# Patient Record
Sex: FEMALE | Race: WHITE | NOT HISPANIC OR LATINO | Employment: FULL TIME | URBAN - METROPOLITAN AREA
[De-identification: names, ages, dates, MRNs, and addresses within clinical notes are randomized per-mention and may not be internally consistent; named-entity substitution may affect disease eponyms.]

---

## 2017-01-12 ENCOUNTER — ROUTINE PRENATAL (OUTPATIENT)
Dept: OBSTETRICS AND GYNECOLOGY | Facility: CLINIC | Age: 32
End: 2017-01-12
Attending: OBSTETRICS & GYNECOLOGY
Payer: COMMERCIAL

## 2017-01-12 VITALS
SYSTOLIC BLOOD PRESSURE: 112 MMHG | DIASTOLIC BLOOD PRESSURE: 64 MMHG | WEIGHT: 174.19 LBS | BODY MASS INDEX: 28.54 KG/M2

## 2017-01-12 DIAGNOSIS — Z34.83 PRENATAL CARE, SUBSEQUENT PREGNANCY, THIRD TRIMESTER: Primary | ICD-10-CM

## 2017-01-12 PROCEDURE — 0502F SUBSEQUENT PRENATAL CARE: CPT | Mod: S$GLB,,, | Performed by: OBSTETRICS & GYNECOLOGY

## 2017-01-12 PROCEDURE — 99999 PR PBB SHADOW E&M-EST. PATIENT-LVL I: CPT | Mod: PBBFAC,,, | Performed by: OBSTETRICS & GYNECOLOGY

## 2017-01-20 ENCOUNTER — OFFICE VISIT (OUTPATIENT)
Dept: MATERNAL FETAL MEDICINE | Facility: CLINIC | Age: 32
End: 2017-01-20
Payer: COMMERCIAL

## 2017-01-20 ENCOUNTER — ROUTINE PRENATAL (OUTPATIENT)
Dept: OBSTETRICS AND GYNECOLOGY | Facility: CLINIC | Age: 32
End: 2017-01-20
Payer: COMMERCIAL

## 2017-01-20 ENCOUNTER — LAB VISIT (OUTPATIENT)
Dept: LAB | Facility: OTHER | Age: 32
End: 2017-01-20
Attending: NURSE PRACTITIONER
Payer: COMMERCIAL

## 2017-01-20 VITALS
DIASTOLIC BLOOD PRESSURE: 82 MMHG | SYSTOLIC BLOOD PRESSURE: 114 MMHG | BODY MASS INDEX: 29.37 KG/M2 | WEIGHT: 179.25 LBS

## 2017-01-20 DIAGNOSIS — O30.043 DICHORIONIC DIAMNIOTIC TWIN PREGNANCY IN THIRD TRIMESTER: ICD-10-CM

## 2017-01-20 DIAGNOSIS — Z34.80 SUPERVISION OF OTHER NORMAL PREGNANCY: ICD-10-CM

## 2017-01-20 DIAGNOSIS — Z34.80 SUPERVISION OF OTHER NORMAL PREGNANCY: Primary | ICD-10-CM

## 2017-01-20 DIAGNOSIS — O30.042 DICHORIONIC DIAMNIOTIC TWIN PREGNANCY IN SECOND TRIMESTER: ICD-10-CM

## 2017-01-20 LAB
ALBUMIN SERPL BCP-MCNC: 2.6 G/DL
ALP SERPL-CCNC: 161 U/L
ALT SERPL W/O P-5'-P-CCNC: 20 U/L
ANION GAP SERPL CALC-SCNC: 8 MMOL/L
AST SERPL-CCNC: 25 U/L
BASOPHILS # BLD AUTO: 0.02 K/UL
BASOPHILS NFR BLD: 0.2 %
BILIRUB SERPL-MCNC: 0.5 MG/DL
BUN SERPL-MCNC: 8 MG/DL
CALCIUM SERPL-MCNC: 8.7 MG/DL
CHLORIDE SERPL-SCNC: 106 MMOL/L
CO2 SERPL-SCNC: 22 MMOL/L
CREAT SERPL-MCNC: 0.7 MG/DL
DIFFERENTIAL METHOD: ABNORMAL
EOSINOPHIL # BLD AUTO: 0 K/UL
EOSINOPHIL NFR BLD: 0.4 %
ERYTHROCYTE [DISTWIDTH] IN BLOOD BY AUTOMATED COUNT: 12.7 %
EST. GFR  (AFRICAN AMERICAN): >60 ML/MIN/1.73 M^2
EST. GFR  (NON AFRICAN AMERICAN): >60 ML/MIN/1.73 M^2
GLUCOSE SERPL-MCNC: 93 MG/DL
HCT VFR BLD AUTO: 36.6 %
HGB BLD-MCNC: 12.6 G/DL
LYMPHOCYTES # BLD AUTO: 1.3 K/UL
LYMPHOCYTES NFR BLD: 14.3 %
MCH RBC QN AUTO: 31.3 PG
MCHC RBC AUTO-ENTMCNC: 34.4 %
MCV RBC AUTO: 91 FL
MONOCYTES # BLD AUTO: 0.6 K/UL
MONOCYTES NFR BLD: 6.5 %
NEUTROPHILS # BLD AUTO: 7 K/UL
NEUTROPHILS NFR BLD: 77.9 %
PLATELET # BLD AUTO: 148 K/UL
PMV BLD AUTO: 11.5 FL
POTASSIUM SERPL-SCNC: 4.4 MMOL/L
PROT SERPL-MCNC: 5.7 G/DL
RBC # BLD AUTO: 4.03 M/UL
SODIUM SERPL-SCNC: 136 MMOL/L
WBC # BLD AUTO: 8.99 K/UL

## 2017-01-20 PROCEDURE — 99999 PR PBB SHADOW E&M-EST. PATIENT-LVL II: CPT | Mod: PBBFAC,,, | Performed by: NURSE PRACTITIONER

## 2017-01-20 PROCEDURE — 87147 CULTURE TYPE IMMUNOLOGIC: CPT

## 2017-01-20 PROCEDURE — 87081 CULTURE SCREEN ONLY: CPT

## 2017-01-20 PROCEDURE — 0502F SUBSEQUENT PRENATAL CARE: CPT | Mod: S$GLB,,, | Performed by: NURSE PRACTITIONER

## 2017-01-20 PROCEDURE — 85025 COMPLETE CBC W/AUTO DIFF WBC: CPT

## 2017-01-20 PROCEDURE — 87184 SC STD DISK METHOD PER PLATE: CPT

## 2017-01-20 PROCEDURE — 76819 FETAL BIOPHYS PROFIL W/O NST: CPT | Mod: 26,76,S$GLB, | Performed by: OBSTETRICS & GYNECOLOGY

## 2017-01-20 PROCEDURE — 36415 COLL VENOUS BLD VENIPUNCTURE: CPT

## 2017-01-20 PROCEDURE — 80053 COMPREHEN METABOLIC PANEL: CPT

## 2017-01-20 PROCEDURE — 86592 SYPHILIS TEST NON-TREP QUAL: CPT

## 2017-01-20 PROCEDURE — 99499 UNLISTED E&M SERVICE: CPT | Mod: S$GLB,,, | Performed by: OBSTETRICS & GYNECOLOGY

## 2017-01-20 PROCEDURE — 76816 OB US FOLLOW-UP PER FETUS: CPT | Mod: 26,76,S$GLB, | Performed by: OBSTETRICS & GYNECOLOGY

## 2017-01-20 NOTE — MR AVS SNAPSHOT
[facility] [address]  [address]  [place] [address]  Phone: [phone]  Fax: [phone]                  Jaleesa Menezes   2017 8:20 AM   Routine Prenatal    Description:  Female : 1985   Provider:  Laila Richardson NP   Department:  [facility] [address]           Reason for Visit     Routine Prenatal Visit                To Do List           Goals (5 Years of Data)     None      [facility]     [facility] -  Assistance  Registered nurses in the [facility] provide clinical advisement, health education, appointment booking, and other advisory services.  Call for this free service at [phone].             Medications           Message regarding Medications     Verify the changes and/or additions to your medication regime listed below are the same as discussed with your clinician today.  If any of these changes or additions are incorrect, please notify your healthcare provider.             Verify that the below list of medications is an accurate representation of the medications you are currently taking.  If none reported, the list may be blank. If incorrect, please contact your healthcare provider. Carry this list with you in case of emergency.           Current Medications     FERROUS SULFATE, DRIED (IRON, DRIED, ORAL) Take 1 tablet by mouth once daily.    prenatal #92-iron-FA #8-ps-dha (ENBRACE HR) 1.5 mg iron- 8.73 mg-6.4 mg CpID Take 1 tablet by mouth once daily.           Clinical Reference Information           Prenatal Vitals     Enc. Date GA Prenatal Vitals Prenatal Pulse Pain Level Urine Albumin/Glucose Edema Presentation Dilation/Effacement/Station    17 35w2d 114/82           17 34w1d 112/64 / 79 kg (174 lb 2.6 oz) 38 cm / 138/152 / Present  0 Negative / Negative None / None      16 32w1d 100/68 / 77.2 kg (170 lb 3.1 oz) 36 cm / 138/129 / Present  0 Negative / Negative None / None       12/23/16 31w2d 114/72 / 74.9 kg (165 lb 2 oz)           12/12/16 29w5d 110/62 / 75.2 kg (165 lb 12.6 oz) 33 cm / 142/155 / Present  0 Negative / Negative None / None      11/8/16 24w6d 112/64 / 72.9 kg (160 lb 11.5 oz) 29 cm / 137/154 / Present  0 Negative / Trace None / None / None / No      10/10/16 20w5d 110/62 / 70.6 kg (155 lb 10.3 oz)  / 152/132 / Present  0 Negative / Negative None / None      9/12/16 16w5d 114/62 / 67.9 kg (149 lb 11.1 oz)  / 154/144 / Present  0 Negative / Negative None / None      8/15/16 12w5d 112/60 / 63.9 kg (140 lb 14 oz)  / 152/148  0 Negative / Negative None / None         Number of fetuses: 1       Vital Signs - Last Recorded  Most recent update: 1/20/2017  8:56 AM by Leena Knapp MA    BP LMP                114/82 05/18/2016          Allergies as of 1/20/2017     No Known Allergies      Immunizations Administered on Date of Encounter - 1/20/2017     None

## 2017-01-20 NOTE — PROGRESS NOTES
Here for routine OB appt at 35w2d, with complaints of ankle swelling and wrist numbenss.  Reports good FM X2.  Denies LOF, denies VB, denies contractions.  Reviewed warning signs of Labor and Preeclampsia.  Daily FM counts reinforced.  MFM scan today- position vertex/ oblique  GBS and T3 labs today  F/U scheduled 1 week

## 2017-01-21 LAB — RPR SER QL: NORMAL

## 2017-01-21 NOTE — PROGRESS NOTES
Indication: Twins - follow-up for fetal growth  (Dr Lila Carlson).   Maternal age (31 years).   Di-di twins.  Twin B: abdominal cyst.   ____________________________________________________________________________  History: Age: 31 years. : 4 Para: 2. Previous pregnancies: Abortions: 1. Living children: 2.   Obstetric History: Mode of last delivery: Vaginal Delivery.    Details on previous pregnancies: Intrauterine deaths < 14W: 1.  ____________________________________________________________________________  Dating:  Stated EDC:  EDC: 17 GA by stated EDC: 35w2d  Biometry Fetus 1:  EDC: 17 GA by Fetus 1: 34w2d  Biometry Fetus 2:  EDC: 17 GA by Fetus 2: 34w2d  Best Overall Assessment: 16 EDC: 17 Assessed GA: 35w2d  The Best Overall Assessment is based on the stated EDC.  ____________________________________________________________________________  General Evaluation:  Fetus 1:  Fetal heart activity: present. Fetal heart rate: 138 bpm.   Presentation: breech, LLQ.   Fetal movement: visible.   Amniotic Fluid: normal. Maximal vertical pocket 3.0 cm.   Cord: 3 vessels.   Placenta: posterior.     Fetus 2:  Fetal heart activity: present. Fetal heart rate: 133 bpm.   Presentation: cephalic, RUQ.   Fetal movement: visible.   Amniotic Fluid: normal. Maximal vertical pocket 3.0 cm.   Cord: 3 vessels.   Placenta: posterior.     ____________________________________________________________________________  Anatomy Scan:  Twin gestation. Chorionicity: dichorionic-diamniotic.  Fetus 1:  Biometry:  BPD 82.1 mm <5th% 33w0d (32w0d to 34w0d)  .7 mm 14th% 35w0d (32w0d to 38w0d)  .3 mm 54th% 35w1d (34w1d to 36w1d)  FL 68.4 mm 39th% 35w1d (32w1d to 38w1d)  .6 mm 20th% 33w0d  EFW (lbs/oz) 5 lbs 10 ozs  EFW (g) 2546 g  65th%       Fetal Anatomy:  Visualized with normal appearance: head, chest, gastrointestinal tract, kidneys, bladder.  Not examined: neck, skin.  Brain: sub-opt due  to position and gestational age.  Face: profile not examined. Sub-opt today but prev seen wnl.  Spine: sub-opt today but prev seen wnl.  Heart:   Angle: to the fetal left. Four-chamber view: sub-opt, prev seen wnl. Left outflow tract: sub-opt, prev seen wnl. Right outflow tract: sub-opt, prev seen wnl. Aortic arch: Normal.  Abdominal Wall: Sub-opt today but prev seen wnl.  Genitalia: do not tell.   Extremities: 4 limbs. Limited view of extremities - previously seen wnl.    Fetus 2:  Biometry:  BPD 83.4 mm 11th% 33w4d (32w4d to 34w4d)  .3 mm 17th% 35w2d (32w2d to 38w2d)  .7 mm 41st% 34w5d (33w5d to 35w5d)  FL 66.3 mm 16th% 34w1d (31w1d to 37w1d)  .8 mm 34th% 34w0d  EFW (lbs/oz) 5 lbs 6 ozs  EFW (g) 2444 g  55th%     Intertwin EFW discordance: 4.0 %.     Fetal Anatomy:  Visualized with normal appearance: head, brain, chest, gastrointestinal tract, kidneys, bladder.  Not examined: neck, skin.  Face: profile not examined. Sub-opt today but prev seen wnl.  Spine: sub-opt today but prev seen wnl.  Heart: Visualized and normal appearance: right outflow tract.   Angle: to the fetal left. Four-chamber view: sub-opt, prev seen wnl. Left outflow tract: sub-opt, prev seen wnl. Aortic arch: Normal.  Abdominal Wall: Sub-opt today but prev seen wnl.  Genitalia: do not tell.   Extremities: 4 limbs. Limited view of extremities - previously seen wnl.    Summary of Ultrasound Findings:  Transabdominal US. U/S machine: [employer].     ____________________________________________________________________________  Fetal Wellbeing Assessment:  Fetus 1:  Amniotic fluid: normal. MVP: 3.0 cm.   Biophysical Profile: Fetal body movements: normal (2), Fetal tone: normal (2), Fetal breathing movements: normal (2), Amniotic fluid volume: normal (2). Score 8 / 8.     Fetus 2:  Amniotic fluid: normal. MVP: 3.0 cm.   Biophysical Profile: Fetal body movements: normal (2), Fetal tone: normal (2), Fetal breathing movements: normal  (2), Amniotic fluid volume: normal (2). Score 8 / 8.     ____________________________________________________________________________  Report Summary:  Impression:   Concordant Dichorionic, Diamniotic twins  Normal fetal growth of each twin   Concordant AFV  Reassurring fetal surveillance of each twin.     Recommendations:   Fetal Movement Counting BID  Consider delivery at 37-38 weeks

## 2017-01-25 PROBLEM — B95.1 POSITIVE GBS TEST: Status: ACTIVE | Noted: 2017-01-25

## 2017-01-25 LAB — BACTERIA SPEC AEROBE CULT: NORMAL

## 2017-01-26 ENCOUNTER — ROUTINE PRENATAL (OUTPATIENT)
Dept: OBSTETRICS AND GYNECOLOGY | Facility: CLINIC | Age: 32
End: 2017-01-26
Attending: OBSTETRICS & GYNECOLOGY
Payer: COMMERCIAL

## 2017-01-26 VITALS
BODY MASS INDEX: 29.41 KG/M2 | DIASTOLIC BLOOD PRESSURE: 72 MMHG | WEIGHT: 179.44 LBS | SYSTOLIC BLOOD PRESSURE: 130 MMHG

## 2017-01-26 DIAGNOSIS — Z34.83 PRENATAL CARE, SUBSEQUENT PREGNANCY, THIRD TRIMESTER: Primary | ICD-10-CM

## 2017-01-26 PROCEDURE — 0502F SUBSEQUENT PRENATAL CARE: CPT | Mod: S$GLB,,, | Performed by: OBSTETRICS & GYNECOLOGY

## 2017-01-26 PROCEDURE — 99999 PR PBB SHADOW E&M-EST. PATIENT-LVL II: CPT | Mod: PBBFAC,,, | Performed by: OBSTETRICS & GYNECOLOGY

## 2017-01-26 NOTE — MR AVS SNAPSHOT
[facility] [address]  [address]  [facility] [address]  Phone: [phone]  Fax: [phone]                  Jaleesa Menezes   2017 2:00 PM   Routine Prenatal    Description:  Female : 1985   Provider:  Lila Carlson MD   Department:  [facility] [address]           Reason for Visit     Routine Prenatal Visit                To Do List           Goals (5 Years of Data)     None      [facility]     [facility] Care [facility] - / Assistance  Registered nurses in the [facility] provide clinical advisement, health education, appointment booking, and other advisory services.  Call for this free service at [phone].             Medications           Message regarding Medications     Verify the changes and/or additions to your medication regime listed below are the same as discussed with your clinician today.  If any of these changes or additions are incorrect, please notify your healthcare provider.             Verify that the below list of medications is an accurate representation of the medications you are currently taking.  If none reported, the list may be blank. If incorrect, please contact your healthcare provider. Carry this list with you in case of emergency.           Current Medications     FERROUS SULFATE, DRIED (IRON, DRIED, ORAL) Take 1 tablet by mouth once daily.    prenatal #92-iron-FA #8-ps-dha (ENBRACE HR) 1.5 mg iron- 8.73 mg-6.4 mg CpID Take 1 tablet by mouth once daily.           Clinical Reference Information           Prenatal Vitals     Enc. Date GA Prenatal Vitals Prenatal Pulse Pain Level Urine Albumin/Glucose Edema Presentation Dilation/Effacement/Station    17 36w1d 130/72 / 81.4 kg (179 lb 7.3 oz)   0 Negative / Negative       17 35w2d 114/82 / 81.3 kg (179 lb 3.7 oz) 39 cm / 138/141 / Present  0 Negative / Negative +1 / +1 / None / No  0 / 40 / -4    17 34w1d 112/64 / 79 kg (174 lb 2.6 oz)  38 cm / 138/152 / Present  0 Negative / Negative None / None      12/29/16 32w1d 100/68 / 77.2 kg (170 lb 3.1 oz) 36 cm / 138/129 / Present  0 Negative / Negative None / None      12/23/16 31w2d 114/72 / 74.9 kg (165 lb 2 oz)           12/12/16 29w5d 110/62 / 75.2 kg (165 lb 12.6 oz) 33 cm / 142/155 / Present  0 Negative / Negative None / None      11/8/16 24w6d 112/64 / 72.9 kg (160 lb 11.5 oz) 29 cm / 137/154 / Present  0 Negative / Trace None / None / None / No      10/10/16 20w5d 110/62 / 70.6 kg (155 lb 10.3 oz)  / 152/132 / Present  0 Negative / Negative None / None      9/12/16 16w5d 114/62 / 67.9 kg (149 lb 11.1 oz)  / 154/144 / Present  0 Negative / Negative None / None      8/15/16 12w5d 112/60 / 63.9 kg (140 lb 14 oz)  / 152/148  0 Negative / Negative None / None         Number of fetuses: 1       Vital Signs - Last Recorded  Most recent update: 1/26/2017  2:13 PM by Kadie Combs MA    BP Wt LMP BMI       130/72 81.4 kg (179 lb 7.3 oz) 05/18/2016 29.41 kg/m2       Allergies as of 1/26/2017     No Known Allergies      Immunizations Administered on Date of Encounter - 1/26/2017     None

## 2017-01-26 NOTE — PROGRESS NOTES
No cramping, no VB, good FM x2, no LOF.  PTL/bleeding precautions. Follow up in 1 week. C/S scheduled on Feb 6 at 730am

## 2017-02-02 ENCOUNTER — ROUTINE PRENATAL (OUTPATIENT)
Dept: OBSTETRICS AND GYNECOLOGY | Facility: CLINIC | Age: 32
End: 2017-02-02
Attending: OBSTETRICS & GYNECOLOGY
Payer: COMMERCIAL

## 2017-02-02 VITALS
BODY MASS INDEX: 28.98 KG/M2 | SYSTOLIC BLOOD PRESSURE: 112 MMHG | WEIGHT: 176.81 LBS | DIASTOLIC BLOOD PRESSURE: 68 MMHG

## 2017-02-02 DIAGNOSIS — Z34.83 PRENATAL CARE, SUBSEQUENT PREGNANCY, THIRD TRIMESTER: Primary | ICD-10-CM

## 2017-02-02 PROCEDURE — 99999 PR PBB SHADOW E&M-EST. PATIENT-LVL II: CPT | Mod: PBBFAC,,, | Performed by: OBSTETRICS & GYNECOLOGY

## 2017-02-02 PROCEDURE — 0502F SUBSEQUENT PRENATAL CARE: CPT | Mod: S$GLB,,, | Performed by: OBSTETRICS & GYNECOLOGY

## 2017-02-02 NOTE — MR AVS SNAPSHOT
[facility] [address]  [address]  [facility] [address]  Phone: [phone]  Fax: [phone]                  Jaleesa Menezes   2017 11:30 AM   Routine Prenatal    Description:  Female : 1985   Provider:  Lila Carlson MD   Department:  [facility] [address]           Reason for Visit     Routine Prenatal Visit                To Do List           Goals (5 Years of Data)     None      [facility]     [facility] Care [facility] -  Assistance  Registered nurses in the [facility] provide clinical advisement, health education, appointment booking, and other advisory services.  Call for this free service at [phone].             Medications           Message regarding Medications     Verify the changes and/or additions to your medication regime listed below are the same as discussed with your clinician today.  If any of these changes or additions are incorrect, please notify your healthcare provider.             Verify that the below list of medications is an accurate representation of the medications you are currently taking.  If none reported, the list may be blank. If incorrect, please contact your healthcare provider. Carry this list with you in case of emergency.           Current Medications     FERROUS SULFATE, DRIED (IRON, DRIED, ORAL) Take 1 tablet by mouth once daily.    prenatal #92-iron-FA #8-ps-dha (ENBRACE HR) 1.5 mg iron- 8.73 mg-6.4 mg CpID Take 1 tablet by mouth once daily.           Clinical Reference Information           Prenatal Vitals     Enc. Date GA Prenatal Vitals Prenatal Pulse Pain Level Urine Albumin/Glucose Edema Presentation Dilation/Effacement/Station    17 37w1d 112/68 / 80.2 kg (176 lb 12.9 oz)   0 Negative / Negative       17 36w1d 130/72 / 81.4 kg (179 lb 7.3 oz) 40 cm / 132/144 / Present  0 Negative / Negative +1 / +1      17 35w2d 114/82 / 81.3 kg (179 lb 3.7 oz) 39 cm / 138/141 /  Present  0 Negative / Negative +1 / +1 / None / No  0 / 40 / -4    1/12/17 34w1d 112/64 / 79 kg (174 lb 2.6 oz) 38 cm / 138/152 / Present  0 Negative / Negative None / None      12/29/16 32w1d 100/68 / 77.2 kg (170 lb 3.1 oz) 36 cm / 138/129 / Present  0 Negative / Negative None / None      12/23/16 31w2d 114/72 / 74.9 kg (165 lb 2 oz)           12/12/16 29w5d 110/62 / 75.2 kg (165 lb 12.6 oz) 33 cm / 142/155 / Present  0 Negative / Negative None / None      11/8/16 24w6d 112/64 / 72.9 kg (160 lb 11.5 oz) 29 cm / 137/154 / Present  0 Negative / Trace None / None / None / No      10/10/16 20w5d 110/62 / 70.6 kg (155 lb 10.3 oz)  / 152/132 / Present  0 Negative / Negative None / None      9/12/16 16w5d 114/62 / 67.9 kg (149 lb 11.1 oz)  / 154/144 / Present  0 Negative / Negative None / None      8/15/16 12w5d 112/60 / 63.9 kg (140 lb 14 oz)  / 152/148  0 Negative / Negative None / None         Number of fetuses: 1       Your Vitals Were     BP Weight Last Period BMI       112/68 80.2 kg (176 lb 12.9 oz) 05/18/2016 28.98 kg/m2       Allergies as of 2/2/2017     No Known Allergies      Immunizations Administered on Date of Encounter - 2/2/2017     None      Language Assistance Services     ATTENTION: Language assistance services are available, free of charge. Please call [phone].      ATENCIÓN: Si habla español, tiene a andrews disposición servicios gratuitos de asistencia lingüística. Llame al [phone].     CHÚ Ý: N?u b?n nói Ti?ng Vi?t, có các d?ch v? h? tr? ngôn ng? mi?n phí dành cho b?n. G?i s? [phone].         [facility] [address] complies with applicable Federal civil rights laws and does not discriminate on the basis of race, color, national origin, age, disability, or sex.

## 2017-02-06 ENCOUNTER — ANESTHESIA EVENT (OUTPATIENT)
Dept: OBSTETRICS AND GYNECOLOGY | Facility: OTHER | Age: 32
End: 2017-02-06
Payer: COMMERCIAL

## 2017-02-06 ENCOUNTER — SURGERY (OUTPATIENT)
Age: 32
End: 2017-02-06

## 2017-02-06 ENCOUNTER — HOSPITAL ENCOUNTER (INPATIENT)
Facility: OTHER | Age: 32
LOS: 3 days | Discharge: HOME | End: 2017-02-09
Attending: OBSTETRICS & GYNECOLOGY | Admitting: OBSTETRICS & GYNECOLOGY
Payer: COMMERCIAL

## 2017-02-06 ENCOUNTER — ANESTHESIA (OUTPATIENT)
Dept: OBSTETRICS AND GYNECOLOGY | Facility: OTHER | Age: 32
End: 2017-02-06
Payer: COMMERCIAL

## 2017-02-06 DIAGNOSIS — O30.043 DICHORIONIC DIAMNIOTIC TWIN PREGNANCY IN THIRD TRIMESTER: ICD-10-CM

## 2017-02-06 DIAGNOSIS — Z98.891 S/P CESAREAN SECTION: Primary | ICD-10-CM

## 2017-02-06 DIAGNOSIS — O30.049 DICHORIONIC DIAMNIOTIC TWIN GESTATION: ICD-10-CM

## 2017-02-06 LAB
ABO + RH BLD: NORMAL
ALLENS TEST: ABNORMAL
ALLENS TEST: ABNORMAL
ANISOCYTOSIS BLD QL SMEAR: SLIGHT
BASOPHILS # BLD AUTO: 0.01 K/UL
BASOPHILS # BLD AUTO: ABNORMAL K/UL
BASOPHILS NFR BLD: 0 %
BASOPHILS NFR BLD: 0.1 %
BLD GP AB SCN CELLS X3 SERPL QL: NORMAL
DIFFERENTIAL METHOD: ABNORMAL
DIFFERENTIAL METHOD: ABNORMAL
EOSINOPHIL # BLD AUTO: 0 K/UL
EOSINOPHIL # BLD AUTO: ABNORMAL K/UL
EOSINOPHIL NFR BLD: 0 %
EOSINOPHIL NFR BLD: 0.2 %
ERYTHROCYTE [DISTWIDTH] IN BLOOD BY AUTOMATED COUNT: 12.6 %
ERYTHROCYTE [DISTWIDTH] IN BLOOD BY AUTOMATED COUNT: 12.6 %
HBV SURFACE AG SERPL QL IA: NEGATIVE
HCO3 UR-SCNC: 21.3 MMOL/L (ref 24–28)
HCO3 UR-SCNC: 22.3 MMOL/L (ref 24–28)
HCT VFR BLD AUTO: 28 %
HCT VFR BLD AUTO: 38 %
HGB BLD-MCNC: 13 G/DL
HGB BLD-MCNC: 9.4 G/DL
HIV 1+2 AB+HIV1 P24 AG SERPL QL IA: NEGATIVE
LYMPHOCYTES # BLD AUTO: 1.6 K/UL
LYMPHOCYTES # BLD AUTO: ABNORMAL K/UL
LYMPHOCYTES NFR BLD: 17 %
LYMPHOCYTES NFR BLD: 18.6 %
MCH RBC QN AUTO: 30.7 PG
MCH RBC QN AUTO: 31 PG
MCHC RBC AUTO-ENTMCNC: 33.6 %
MCHC RBC AUTO-ENTMCNC: 34.2 %
MCV RBC AUTO: 91 FL
MCV RBC AUTO: 92 FL
MONOCYTES # BLD AUTO: 0.5 K/UL
MONOCYTES # BLD AUTO: ABNORMAL K/UL
MONOCYTES NFR BLD: 4 %
MONOCYTES NFR BLD: 6 %
NEUTROPHILS # BLD AUTO: 6.5 K/UL
NEUTROPHILS NFR BLD: 72 %
NEUTROPHILS NFR BLD: 74.8 %
NEUTS BAND NFR BLD MANUAL: 7 %
PCO2 BLDA: 51.5 MMHG (ref 35–45)
PCO2 BLDA: 61.9 MMHG (ref 35–45)
PH SMN: 7.16 [PH] (ref 7.35–7.45)
PH SMN: 7.22 [PH] (ref 7.35–7.45)
PLATELET # BLD AUTO: 111 K/UL
PLATELET # BLD AUTO: 146 K/UL
PLATELET BLD QL SMEAR: ABNORMAL
PMV BLD AUTO: 10.5 FL
PMV BLD AUTO: 11.7 FL
PO2 BLDA: 18 MMHG (ref 80–100)
PO2 BLDA: 26 MMHG (ref 80–100)
POC BE: -6 MMOL/L
POC BE: -6 MMOL/L
POC SATURATED O2: 17 % (ref 95–100)
POC SATURATED O2: 36 % (ref 95–100)
RBC # BLD AUTO: 3.06 M/UL
RBC # BLD AUTO: 4.19 M/UL
SAMPLE: ABNORMAL
SAMPLE: ABNORMAL
SITE: ABNORMAL
SITE: ABNORMAL
WBC # BLD AUTO: 7.46 K/UL
WBC # BLD AUTO: 8.67 K/UL

## 2017-02-06 PROCEDURE — 86703 HIV-1/HIV-2 1 RESULT ANTBDY: CPT

## 2017-02-06 PROCEDURE — 63600175 PHARM REV CODE 636 W HCPCS: Performed by: ANESTHESIOLOGY

## 2017-02-06 PROCEDURE — 85027 COMPLETE CBC AUTOMATED: CPT

## 2017-02-06 PROCEDURE — 82803 BLOOD GASES ANY COMBINATION: CPT

## 2017-02-06 PROCEDURE — 88307 TISSUE EXAM BY PATHOLOGIST: CPT | Performed by: PATHOLOGY

## 2017-02-06 PROCEDURE — 25000003 PHARM REV CODE 250: Performed by: STUDENT IN AN ORGANIZED HEALTH CARE EDUCATION/TRAINING PROGRAM

## 2017-02-06 PROCEDURE — 11000001 HC ACUTE MED/SURG PRIVATE ROOM

## 2017-02-06 PROCEDURE — 37000008 HC ANESTHESIA 1ST 15 MINUTES: Performed by: OBSTETRICS & GYNECOLOGY

## 2017-02-06 PROCEDURE — 25000003 PHARM REV CODE 250: Performed by: ANESTHESIOLOGY

## 2017-02-06 PROCEDURE — 99900035 HC TECH TIME PER 15 MIN (STAT)

## 2017-02-06 PROCEDURE — 59510 CESAREAN DELIVERY: CPT | Mod: 22,,, | Performed by: OBSTETRICS & GYNECOLOGY

## 2017-02-06 PROCEDURE — 36415 COLL VENOUS BLD VENIPUNCTURE: CPT

## 2017-02-06 PROCEDURE — 87340 HEPATITIS B SURFACE AG IA: CPT

## 2017-02-06 PROCEDURE — 37000009 HC ANESTHESIA EA ADD 15 MINS: Performed by: OBSTETRICS & GYNECOLOGY

## 2017-02-06 PROCEDURE — 85025 COMPLETE CBC W/AUTO DIFF WBC: CPT

## 2017-02-06 PROCEDURE — 85007 BL SMEAR W/DIFF WBC COUNT: CPT

## 2017-02-06 PROCEDURE — 59510 CESAREAN DELIVERY: CPT | Mod: ,,, | Performed by: ANESTHESIOLOGY

## 2017-02-06 PROCEDURE — 86762 RUBELLA ANTIBODY: CPT

## 2017-02-06 PROCEDURE — 86850 RBC ANTIBODY SCREEN: CPT

## 2017-02-06 PROCEDURE — 27200688 HC TRAY, SPINAL-HYPER/ ISOBARIC: Performed by: ANESTHESIOLOGY

## 2017-02-06 PROCEDURE — 63600175 PHARM REV CODE 636 W HCPCS: Performed by: STUDENT IN AN ORGANIZED HEALTH CARE EDUCATION/TRAINING PROGRAM

## 2017-02-06 PROCEDURE — 88307 TISSUE EXAM BY PATHOLOGIST: CPT | Mod: 26,,, | Performed by: PATHOLOGY

## 2017-02-06 PROCEDURE — 86900 BLOOD TYPING SEROLOGIC ABO: CPT

## 2017-02-06 RX ORDER — SODIUM CHLORIDE, SODIUM LACTATE, POTASSIUM CHLORIDE, CALCIUM CHLORIDE 600; 310; 30; 20 MG/100ML; MG/100ML; MG/100ML; MG/100ML
INJECTION, SOLUTION INTRAVENOUS CONTINUOUS
Status: ACTIVE | OUTPATIENT
Start: 2017-02-06 | End: 2017-02-06

## 2017-02-06 RX ORDER — MISOPROSTOL 200 UG/1
800 TABLET ORAL
Status: DISCONTINUED | OUTPATIENT
Start: 2017-02-06 | End: 2017-02-06

## 2017-02-06 RX ORDER — FAMOTIDINE 10 MG/ML
20 INJECTION INTRAVENOUS ONCE
Status: COMPLETED | OUTPATIENT
Start: 2017-02-06 | End: 2017-02-06

## 2017-02-06 RX ORDER — IBUPROFEN 600 MG/1
600 TABLET ORAL EVERY 6 HOURS
Qty: 30 TABLET | Refills: 0 | Status: SHIPPED | OUTPATIENT
Start: 2017-02-07 | End: 2017-03-20

## 2017-02-06 RX ORDER — IBUPROFEN 600 MG/1
600 TABLET ORAL EVERY 6 HOURS
Status: DISCONTINUED | OUTPATIENT
Start: 2017-02-07 | End: 2017-02-09 | Stop reason: HOSPADM

## 2017-02-06 RX ORDER — AMOXICILLIN 250 MG
1 CAPSULE ORAL NIGHTLY PRN
Status: DISCONTINUED | OUTPATIENT
Start: 2017-02-06 | End: 2017-02-09 | Stop reason: HOSPADM

## 2017-02-06 RX ORDER — OXYCODONE AND ACETAMINOPHEN 10; 325 MG/1; MG/1
1 TABLET ORAL EVERY 4 HOURS PRN
Status: DISCONTINUED | OUTPATIENT
Start: 2017-02-07 | End: 2017-02-09 | Stop reason: HOSPADM

## 2017-02-06 RX ORDER — MORPHINE SULFATE 0.5 MG/ML
INJECTION, SOLUTION EPIDURAL; INTRATHECAL; INTRAVENOUS
Status: DISCONTINUED | OUTPATIENT
Start: 2017-02-06 | End: 2017-02-06

## 2017-02-06 RX ORDER — KETOROLAC TROMETHAMINE 30 MG/ML
30 INJECTION, SOLUTION INTRAMUSCULAR; INTRAVENOUS EVERY 6 HOURS
Status: COMPLETED | OUTPATIENT
Start: 2017-02-06 | End: 2017-02-07

## 2017-02-06 RX ORDER — METOCLOPRAMIDE HYDROCHLORIDE 5 MG/ML
10 INJECTION INTRAMUSCULAR; INTRAVENOUS ONCE
Status: COMPLETED | OUTPATIENT
Start: 2017-02-06 | End: 2017-02-06

## 2017-02-06 RX ORDER — OXYTOCIN/RINGER'S LACTATE 20/1000 ML
41.65 PLASTIC BAG, INJECTION (ML) INTRAVENOUS CONTINUOUS
Status: ACTIVE | OUTPATIENT
Start: 2017-02-06 | End: 2017-02-06

## 2017-02-06 RX ORDER — ONDANSETRON 2 MG/ML
INJECTION INTRAMUSCULAR; INTRAVENOUS
Status: DISCONTINUED | OUTPATIENT
Start: 2017-02-06 | End: 2017-02-06

## 2017-02-06 RX ORDER — ONDANSETRON 2 MG/ML
4 INJECTION INTRAMUSCULAR; INTRAVENOUS EVERY 8 HOURS PRN
Status: DISCONTINUED | OUTPATIENT
Start: 2017-02-06 | End: 2017-02-09 | Stop reason: HOSPADM

## 2017-02-06 RX ORDER — DOCUSATE SODIUM 100 MG/1
200 CAPSULE, LIQUID FILLED ORAL 2 TIMES DAILY
Status: DISCONTINUED | OUTPATIENT
Start: 2017-02-06 | End: 2017-02-09 | Stop reason: HOSPADM

## 2017-02-06 RX ORDER — OXYCODONE AND ACETAMINOPHEN 5; 325 MG/1; MG/1
1 TABLET ORAL EVERY 4 HOURS PRN
Qty: 30 TABLET | Refills: 0 | Status: SHIPPED | OUTPATIENT
Start: 2017-02-07 | End: 2017-02-09

## 2017-02-06 RX ORDER — NALOXONE HCL 0.4 MG/ML
0.04 VIAL (ML) INJECTION
Status: DISCONTINUED | OUTPATIENT
Start: 2017-02-06 | End: 2017-02-09 | Stop reason: HOSPADM

## 2017-02-06 RX ORDER — SIMETHICONE 80 MG
1 TABLET,CHEWABLE ORAL EVERY 6 HOURS PRN
Status: DISCONTINUED | OUTPATIENT
Start: 2017-02-06 | End: 2017-02-09 | Stop reason: HOSPADM

## 2017-02-06 RX ORDER — ACETAMINOPHEN 10 MG/ML
INJECTION, SOLUTION INTRAVENOUS
Status: DISCONTINUED | OUTPATIENT
Start: 2017-02-06 | End: 2017-02-06

## 2017-02-06 RX ORDER — ACETAMINOPHEN 325 MG/1
650 TABLET ORAL EVERY 6 HOURS
Status: DISCONTINUED | OUTPATIENT
Start: 2017-02-06 | End: 2017-02-07

## 2017-02-06 RX ORDER — FENTANYL CITRATE 50 UG/ML
INJECTION, SOLUTION INTRAMUSCULAR; INTRAVENOUS
Status: DISCONTINUED | OUTPATIENT
Start: 2017-02-06 | End: 2017-02-06

## 2017-02-06 RX ORDER — OXYTOCIN 10 [USP'U]/ML
INJECTION, SOLUTION INTRAMUSCULAR; INTRAVENOUS
Status: DISCONTINUED | OUTPATIENT
Start: 2017-02-06 | End: 2017-02-06

## 2017-02-06 RX ORDER — SODIUM CHLORIDE, SODIUM LACTATE, POTASSIUM CHLORIDE, CALCIUM CHLORIDE 600; 310; 30; 20 MG/100ML; MG/100ML; MG/100ML; MG/100ML
INJECTION, SOLUTION INTRAVENOUS CONTINUOUS
Status: DISCONTINUED | OUTPATIENT
Start: 2017-02-06 | End: 2017-02-06

## 2017-02-06 RX ORDER — BUPIVACAINE HYDROCHLORIDE 7.5 MG/ML
INJECTION, SOLUTION INTRASPINAL
Status: DISCONTINUED | OUTPATIENT
Start: 2017-02-06 | End: 2017-02-06

## 2017-02-06 RX ORDER — OXYCODONE HYDROCHLORIDE 5 MG/1
10 TABLET ORAL EVERY 4 HOURS PRN
Status: DISCONTINUED | OUTPATIENT
Start: 2017-02-06 | End: 2017-02-09 | Stop reason: HOSPADM

## 2017-02-06 RX ORDER — BISACODYL 10 MG
10 SUPPOSITORY, RECTAL RECTAL ONCE AS NEEDED
Status: ACTIVE | OUTPATIENT
Start: 2017-02-06 | End: 2017-02-06

## 2017-02-06 RX ORDER — ONDANSETRON 8 MG/1
8 TABLET, ORALLY DISINTEGRATING ORAL EVERY 8 HOURS PRN
Status: DISCONTINUED | OUTPATIENT
Start: 2017-02-06 | End: 2017-02-09 | Stop reason: HOSPADM

## 2017-02-06 RX ORDER — PHENYLEPHRINE HYDROCHLORIDE 10 MG/ML
INJECTION INTRAVENOUS
Status: DISCONTINUED | OUTPATIENT
Start: 2017-02-06 | End: 2017-02-06

## 2017-02-06 RX ORDER — SODIUM CITRATE AND CITRIC ACID MONOHYDRATE 334; 500 MG/5ML; MG/5ML
30 SOLUTION ORAL ONCE
Status: COMPLETED | OUTPATIENT
Start: 2017-02-06 | End: 2017-02-06

## 2017-02-06 RX ORDER — DIPHENHYDRAMINE HCL 25 MG
25 CAPSULE ORAL EVERY 4 HOURS PRN
Status: DISCONTINUED | OUTPATIENT
Start: 2017-02-06 | End: 2017-02-09 | Stop reason: HOSPADM

## 2017-02-06 RX ORDER — OXYCODONE AND ACETAMINOPHEN 5; 325 MG/1; MG/1
1 TABLET ORAL EVERY 4 HOURS PRN
Status: DISCONTINUED | OUTPATIENT
Start: 2017-02-07 | End: 2017-02-09 | Stop reason: HOSPADM

## 2017-02-06 RX ORDER — METOCLOPRAMIDE HYDROCHLORIDE 5 MG/ML
10 INJECTION INTRAMUSCULAR; INTRAVENOUS EVERY 8 HOURS PRN
Status: DISCONTINUED | OUTPATIENT
Start: 2017-02-06 | End: 2017-02-09 | Stop reason: HOSPADM

## 2017-02-06 RX ORDER — ADHESIVE BANDAGE
30 BANDAGE TOPICAL 2 TIMES DAILY PRN
Status: DISCONTINUED | OUTPATIENT
Start: 2017-02-07 | End: 2017-02-09 | Stop reason: HOSPADM

## 2017-02-06 RX ORDER — OXYCODONE HYDROCHLORIDE 5 MG/1
5 TABLET ORAL EVERY 4 HOURS PRN
Status: DISCONTINUED | OUTPATIENT
Start: 2017-02-06 | End: 2017-02-09 | Stop reason: HOSPADM

## 2017-02-06 RX ORDER — CEFAZOLIN SODIUM 2 G/50ML
2 SOLUTION INTRAVENOUS
Status: COMPLETED | OUTPATIENT
Start: 2017-02-06 | End: 2017-02-06

## 2017-02-06 RX ORDER — KETOROLAC TROMETHAMINE 30 MG/ML
INJECTION, SOLUTION INTRAMUSCULAR; INTRAVENOUS
Status: DISCONTINUED | OUTPATIENT
Start: 2017-02-06 | End: 2017-02-06

## 2017-02-06 RX ORDER — HYDROCORTISONE 25 MG/G
CREAM TOPICAL 3 TIMES DAILY PRN
Status: DISCONTINUED | OUTPATIENT
Start: 2017-02-06 | End: 2017-02-09 | Stop reason: HOSPADM

## 2017-02-06 RX ADMIN — PROMETHAZINE HYDROCHLORIDE 6.25 MG: 25 INJECTION INTRAMUSCULAR; INTRAVENOUS at 11:02

## 2017-02-06 RX ADMIN — Medication 0.3 MG: at 07:02

## 2017-02-06 RX ADMIN — ACETAMINOPHEN 1000 MG: 10 INJECTION, SOLUTION INTRAVENOUS at 07:02

## 2017-02-06 RX ADMIN — OXYTOCIN 2 UNITS: 10 INJECTION, SOLUTION INTRAMUSCULAR; INTRAVENOUS at 07:02

## 2017-02-06 RX ADMIN — SODIUM CITRATE AND CITRIC ACID MONOHYDRATE 30 ML: 500; 334 SOLUTION ORAL at 07:02

## 2017-02-06 RX ADMIN — PHENYLEPHRINE HYDROCHLORIDE 100 MCG: 10 INJECTION INTRAVENOUS at 07:02

## 2017-02-06 RX ADMIN — CEFAZOLIN SODIUM 2 G: 2 SOLUTION INTRAVENOUS at 07:02

## 2017-02-06 RX ADMIN — DOCUSATE SODIUM 200 MG: 100 CAPSULE, LIQUID FILLED ORAL at 08:02

## 2017-02-06 RX ADMIN — FENTANYL CITRATE 10 MCG: 50 INJECTION, SOLUTION INTRAMUSCULAR; INTRAVENOUS at 07:02

## 2017-02-06 RX ADMIN — KETOROLAC TROMETHAMINE 30 MG: 30 INJECTION, SOLUTION INTRAMUSCULAR at 02:02

## 2017-02-06 RX ADMIN — ACETAMINOPHEN 650 MG: 325 TABLET ORAL at 08:02

## 2017-02-06 RX ADMIN — EPHEDRINE SULFATE 5 MG: 50 INJECTION INTRAMUSCULAR; INTRAVENOUS; SUBCUTANEOUS at 07:02

## 2017-02-06 RX ADMIN — SODIUM CHLORIDE, SODIUM LACTATE, POTASSIUM CHLORIDE, AND CALCIUM CHLORIDE: 600; 310; 30; 20 INJECTION, SOLUTION INTRAVENOUS at 07:02

## 2017-02-06 RX ADMIN — EPHEDRINE SULFATE 10 MG: 50 INJECTION INTRAMUSCULAR; INTRAVENOUS; SUBCUTANEOUS at 07:02

## 2017-02-06 RX ADMIN — ACETAMINOPHEN 650 MG: 325 TABLET ORAL at 02:02

## 2017-02-06 RX ADMIN — KETOROLAC TROMETHAMINE 30 MG: 30 INJECTION, SOLUTION INTRAMUSCULAR at 09:02

## 2017-02-06 RX ADMIN — ONDANSETRON 4 MG: 2 INJECTION INTRAMUSCULAR; INTRAVENOUS at 10:02

## 2017-02-06 RX ADMIN — FAMOTIDINE 20 MG: 10 INJECTION, SOLUTION INTRAVENOUS at 07:02

## 2017-02-06 RX ADMIN — BUPIVACAINE HYDROCHLORIDE IN DEXTROSE 1.6 ML: 7.5 INJECTION, SOLUTION SUBARACHNOID at 07:02

## 2017-02-06 RX ADMIN — ONDANSETRON 4 MG: 2 INJECTION INTRAMUSCULAR; INTRAVENOUS at 07:02

## 2017-02-06 RX ADMIN — SODIUM CHLORIDE, SODIUM LACTATE, POTASSIUM CHLORIDE, AND CALCIUM CHLORIDE: 600; 310; 30; 20 INJECTION, SOLUTION INTRAVENOUS at 06:02

## 2017-02-06 RX ADMIN — METOCLOPRAMIDE 10 MG: 5 INJECTION, SOLUTION INTRAMUSCULAR; INTRAVENOUS at 07:02

## 2017-02-06 RX ADMIN — KETOROLAC TROMETHAMINE 30 MG: 30 INJECTION, SOLUTION INTRAMUSCULAR; INTRAVENOUS at 07:02

## 2017-02-06 NOTE — L&D DELIVERY NOTE
Section Operative Note  Procedure Date: 2017    Procedure: Primary  Section via Pfannenstiel skin incision    Indications:   1. Di-Di IUP at 37w5d  2. Malpositioned of presenting twin    Pre-operative Diagnosis: IUP at 37 week 5 day pregnancy    Post-operative Diagnosis: same    Surgeon: Lila Carlson MD     Assistants: Dion Tsai MD    Anesthesia: Spinal anesthesia    Findings:   1. Viable Female infant in transverse back up position in maternal left  2. Viable Male infant in cephalic position  3. Normal fallopian tubes and ovaries bilaterally    Estimated Blood Loss:  1100cc           Total IV Fluids: 1500 mL     UOP: 100 mL    Specimens: viable female infant, viable male infant. Placenta which was sent for pathology.     PreOp CBC:   Lab Results   Component Value Date    WBC 7.46 2017    HGB 13.0 2017    HCT 38.0 2017    MCV 91 2017     (L) 2017                     Complications:  None; patient tolerated the procedure well.           Disposition: PACU - hemodynamically stable.           Condition: stable    Procedure Details   The patient was seen in the Holding Room. The risks, benefits, complications, treatment options, and expected outcomes were discussed with the patient.  The patient concurred with the proposed plan, giving informed consent.  The site of surgery properly noted/marked. The patient was taken to Operating Room, identified as Jaleesa Chepeaiyana and the procedure verified as Primary Pfannenstiel  Delivery. A Time Out was held and the above information confirmed.    After induction of anesthesia, the patient was prepped and draped in the usual sterile manner while placed in a dorsal supine position with a left lateral tilt.  A mercedes catheter was also placed per nursing. Preoperative antibiotics, Ancef 2g x 1 were administered and an allis test was performed yielding adequate anesthesia.  A Pfannenstiel  incision was made and carried down through the subcutaneous tissue to the fascia. Fascial incision was made and extended transversely. The fascia was grasped with Kocher clamps and  from the underlying rectus tissue superiorly and inferiorly. The peritoneum was identified, lifted with hemostat and found to be free of adherent bowl and entered bluntly as there was a small opening in the peritoneum. Peritoneal incision was extended longitudinally. The vesico-uterine peritoneum was identified and bladder blade was inserted. The vesico-uterine peritoneum was incised transversely and the bladder flap was bluntly freed from the lower uterine segment. The bladder blade was reinserted to keep the bladder out of the operative field. A low transverse uterine incision was made with knife and extended digitally. The amniotic sac was ruptured with the knife while entering the uterus and the infant was noted to be in transverse, back up  position. The buttocks were brought to the incision and elevated out of the pelvis. The patient delivered a  viable female infant without difficulty.  Infant weighed 2835 grams with Apgar scores of 9/9 at one and five minutes respectively. The amniotic sac from twin B was ruptured with the hemostat the infant was noted to be in cephalic position. The head was brought to the incision and elevated out of the pelvis. The patient delivered a viable male infant without difficulty.  Infant weighed 2353 grams with Apgar scores of 9/9 at one and five minutes respectively. After the umbilical cords were clamped and cut cord blood was obtained for evaluation. The placentas were removed intact and appeared normal and sent to pathology. The uterus was exteriorized. The uterine outline, tubes and ovaries appeared normal. The uterine incision was closed with running locked sutures of #1 Chromic. Hemostasis was observed. The uterus was returned to the abdominal cavity. Incision was reinspected and good  hemostasis was noted. The abdominal cavity was irrigated to remove clots. The peritoneum and muscle was reapproximated with 2-0 Vicryl and #1 Chromic respectively. The fascia was then reapproximated with running sutures of #1 PDS. The skin was reapproximated with 4-0 monocryl     Instrument, sponge, and needle counts were correct prior the abdominal closure and at the conclusion of the case.     Pt tolerated procedure well and Dr. Carlson discussed with family that pt was stable and in good condition after the procedure.             Liseth Menezes [[id number]]     Delivery Information for  Liseth Menezes    Birth information:  YOB: 2019   Time of birth: 7:31 AM   Sex: female   Head Delivery Date/Time: 2017  7:31 AM   Delivery type: , Low Transverse   Gestational Age: 37w5d    Delivery Providers    Delivering clinician:  APOLONIA CARLSON   Other personnel:   Provider Role   ROB PHELPS, PEREZ MANCUSO                   [place] Measurements    Weight:  2835 g Length:  47.6 cm   Head circum.:  32.4 cm Chest circum.:  34.3 cm           Assessment    Apgars    1 Minute:   5 Minute:   10 Minute 15 Minute 20 Minute   Skin Color: 1  1       Heart Rate: 2  2       Reflex Irritability: 2  2       Muscle Tone: 2  2       Respiratory Effort: 2  2       Total: 9  9                         Assisted Delivery Details:    Forceps attempted?:  No   Vacuum extractor attempted?:  No             Shoulder Dystocia    Shoulder dystocia present?:  No                                             Presentation and Position    Presentation: Alex Breech   Position:                    Interventions/Resuscitation    Method:  NICU Attended        Cord    Vessels:  3 vessels   Complications:  None   Delayed Cord Clamping?:  No   Cord Blood Disposition:  Sent with Baby   Gases Sent?:  Yes        Placenta    Date and time:  2017  7:35 AM    Removal:  Manual removal   Placenta disposition:  Pathology            Labor Events:       labor:       Labor Onset Date/Time:         Dilation Complete Date/Time:         Start Pushing Date/Time:       Rupture Date/Time:              Rupture type:           Fluid Amount:        Fluid Color:        Fluid Odor:        Membrane Status (PeriCalm):        Rupture Date/Time (PeriCalm):        Fluid Amount (PeriCalm):        Fluid Color (PeriCalm):         steroids:       Antibiotics given for GBS:       Induction:       Indications for induction:        Augmentation:       Indications for augmentation:       Labor complications:       Additional complications:          Cervical ripening:                     Delivery:      Episiotomy: None     Indication for Episiotomy:       Perineal Lacerations: None Repaired:      Periurethral Laceration: none Repaired:     Labial Laceration: none Repaired:     Sulcus Laceration: none Repaired:     Vaginal Laceration: No Repaired:     Cervical Laceration: No Repaired:     Repair suture: None     Repair # of packets:       Blood loss (ml): 1112     Vaginal Sweep Performed: No     Surgicount Correct: Yes       Other providers:       Anesthesia    Method:  Spinal              Details (if applicable):  Trial of Labor No    Categorization: Repeat    Priority:     Indications for :     Incision Type: Low Transverse     Additional  information:  Forceps:    Vacuum:    Breech:    Observed anomalies    Other (Comments):            MICHELLE Menezes [[id number]]     Delivery Information for MICHELLE Menezes    Birth information:  YOB: 2019   Time of birth: 7:33 AM   Sex: male   Head Delivery Date/Time: 2017  7:33 AM   Delivery type: , Low Transverse   Gestational Age: 37w5d    Delivery Providers    Delivering clinician:  APOLONIA MATOS   Other personnel:   Provider Role   MATTIE  BISI ARMENTAPEREZ                    Measurements    Weight:  2353 g Length:  47.6 cm   Head circum.:  33 cm Chest circum.:  29.2 cm           Assessment    Living status:  Yes   Apgars    1 Minute:   5 Minute:   10 Minute 15 Minute 20 Minute   Skin Color: 1  1       Heart Rate: 2  2       Reflex Irritability: 2  2       Muscle Tone: 2  2       Respiratory Effort: 2  2       Total: 9  9                         Assisted Delivery Details:    Forceps attempted?:  No   Vacuum extractor attempted?:  No             Shoulder Dystocia    Shoulder dystocia present?:  No                                             Presentation and Position    Presentation: Vertex   Position:                    Interventions/Resuscitation    Method:  NICU Attended        Cord    Vessels:  3 vessels   Complications:  None   Delayed Cord Clamping?:  No   Gases Sent?:  Yes              Labor Events:       labor:       Labor Onset Date/Time:         Dilation Complete Date/Time:         Start Pushing Date/Time:       Rupture Date/Time:              Rupture type:           Fluid Amount:        Fluid Color:        Fluid Odor:        Membrane Status (PeriCalm):        Rupture Date/Time (PeriCalm):        Fluid Amount (PeriCalm):        Fluid Color (PeriCalm):         steroids:       Antibiotics given for GBS:       Induction:       Indications for induction:        Augmentation:       Indications for augmentation:       Labor complications:       Additional complications:          Cervical ripening:                     Delivery:      Episiotomy: None     Indication for Episiotomy:       Perineal Lacerations: None Repaired:      Periurethral Laceration: none Repaired:     Labial Laceration: none Repaired:     Sulcus Laceration: none Repaired:     Vaginal Laceration: No Repaired:     Cervical Laceration: No Repaired:     Repair suture:       Repair # of packets:       Blood loss (ml): 1112      Vaginal Sweep Performed: No     Surgicount Correct: Yes       Other providers:       Anesthesia    Method:  Spinal              Details (if applicable):  Trial of Labor No    Categorization: Repeat    Priority: Routine   Indications for : Multiple Gestation   Incision Type: Low Transverse     Additional  information:  Forceps:    Vacuum:    Breech:    Observed anomalies    Other (Comments):         Dion Tsai MD  PGY 3 OB/GYN  [phone]

## 2017-02-06 NOTE — PLAN OF CARE
Problem: Breastfeeding (Adult,Obstetrics,Pediatric)  Goal: Signs and Symptoms of Listed Potential Problems Will be Absent, Minimized or Managed (Breastfeeding)  Signs and symptoms of listed potential problems will be absent, minimized or managed by discharge/transition of care (reference Breastfeeding (Adult,Obstetrics,Pediatric) CPG).  Outcome: Ongoing (interventions implemented as appropriate)  Patient will effectively breastfeed infant with comfortable, effective latch in response to infant's feeding cues 8 or more times per 24 hour period and will establish milk supply adequate to meet infant's nutritional needs. Patient will contact lactation as needed for assistance or with any questions or concerns.

## 2017-02-06 NOTE — LACTATION NOTE
Lactation packet provided. Reviewed what to expect in the early  period, infant feeding/hunger cues, cue based feeding on demand, proper positioning and latch technique, nutritive versus non-nutritive suckling, listening for audible swallows, expected output, uninterrupted skin to skin contact, unrestricted access to breast, and manual expression of milk. Encouraged to avoid artificial nipples and formula supplementation unless medically necessary, and reviewed risks. Mother reports baby girl has  well. Baby boy  for 5 minutes about 20 mintues ago and is now on mother's chest skin to skin. Discussed concerns and recommendations regarding his blood sugars. Encouraged to feed on demand 8 or more times per day and to request assistance as needed. Provided contact number for lactation. Verbalizes understanding.

## 2017-02-06 NOTE — ANESTHESIA PROCEDURE NOTES
Spinal    Diagnosis: iup  Patient location during procedure: OR  Start time: 2/6/2017 7:07 AM  Timeout: 2/6/2017 7:07 AM  End time: 2/6/2017 7:15 AM  Staffing  Anesthesiologist: DONALD DILLON  Resident/CRNA: KIRA STROUD  Other anesthesia staff: TAMARA PRESLEY  Performed by: resident/CRNA   Preanesthetic Checklist  Completed: patient identified, site marked, surgical consent, pre-op evaluation, timeout performed, IV checked, risks and benefits discussed and monitors and equipment checked  Spinal Block  Patient position: sitting  Prep: ChloraPrep  Patient monitoring: heart rate and continuous pulse ox  Approach: midline  Location: L3-4  Injection technique: single shot  CSF Fluid: clear free-flowing CSF  Needle  Needle type: pencil-tip   Needle gauge: 25 G  Needle length: 3.5 in  Additional Documentation: incremental injection and no paresthesia on injection  Needle localization: anatomical landmarks  Assessment  Sensory level: T5   Dermatomal levels determined by pinch or prick  Ease of block: easy  Medications:  Bolus administered: 1.6 mL of 0.75 bupivacaine  Opioid administered: 10 mcg of   fentanyl (morphine 150mcg)

## 2017-02-06 NOTE — H&P
HISTORY AND PHYSICAL                                                OBSTETRICS          Subjective:       Jaleesa Menezes is a 31 y.o.  female w/di-di twins and GBS+ status with IUP at 37w5d weeks gestation who presents for a scheduled  2/2 breech presentation of Baby A.  The patient has no complaints at this time.  Patient reports good FM.  She denies ctx, vb and lof.  All questions answered and consents signed.    PMHx: No past medical history on file.    PSHx:   Past Surgical History   Procedure Laterality Date    Bath tooth extraction      Dilation and curettage of uterus       missed Ab       All: Review of patient's allergies indicates:  No Known Allergies    Meds:   Prescriptions Prior to Admission   Medication Sig Dispense Refill Last Dose    FERROUS SULFATE, DRIED (IRON, DRIED, ORAL) Take 1 tablet by mouth once daily.   Taking    prenatal #92-iron-FA #8-ps-dha (ENBRACE HR) 1.5 mg iron- 8.73 mg-6.4 mg CpID Take 1 tablet by mouth once daily. 30 each 11 Taking       SH:   Social History     Social History    Marital status:      Spouse name: N/A    Number of children: N/A    Years of education: N/A     Occupational History    teacher/      Social History Main Topics    Smoking status: Never Smoker    Smokeless tobacco: Not on file    Alcohol use No    Drug use: No    Sexual activity: Yes     Partners: Male     Birth control/ protection: None     Other Topics Concern    Are You Pregnant Or Think You May Be? Yes    Breast-Feeding No     Social History Narrative       FH:   Family History   Problem Relation Age of Onset    Asthma Son     Psoriasis Father     Breast cancer Neg Hx     Cancer Neg Hx     Ovarian cancer Neg Hx     Melanoma Neg Hx     Colon cancer Neg Hx        OBHx:   Obstetric History       T2      TAB0   SAB1   E0   M0   L2       # Outcome Date GA Lbr Santiago/2nd Weight Sex Delivery Anes PTL Lv   4 Current            3  SAB 01/12/16 8w0d             Complications: Hemorrhage   2 Term 10/19/14 40w1d 07:31 / 00:17 3.561 kg (7 lb 13.6 oz) M Vag-Spont EPI N Y      Complications: Nuchal cord      Apgar1:  8                Apgar5: 9   1 Term 08/19/12 39w5d  3.345 kg (7 lb 6 oz) M Vag-Spont EPI  Y          Objective:         Visit Vitals    /84    Pulse 75    Temp 97.2 °F (36.2 °C)    LMP 05/18/2016    SpO2 100%    Breastfeeding No       Vitals:    02/06/17 0604 02/06/17 0605   BP: 124/84    Pulse: 75 75   Temp: 97.2 °F (36.2 °C)    SpO2:  100%       General:   alert, appears stated age, cooperative and no distress   Lungs:   clear to auscultation bilaterally   Heart:   regular rate and rhythm, S1, S2 normal, no murmur, click, rub or gallop   Abdomen:  soft, non-tender; bowel sounds normal; no masses,  no organomegaly   Extremities negative edema, negative erythema   FHT: Baby A: 135, mod btbv, +accels/-decels, reactive, reassuring  Baby B: 140, mod btbv, +accels/-decels, reactive, reassuring                 TOCO: q 10 min   Presentations: Baby A: breech by ultrasound  Baby B: transverse    Cervix: Deferred         Lab Review  Blood Type A POS  GBBS: positive  Rubella: Immune  RPR: NR  HIV: pending  HepB: pending       Assessment:       37w5d weeks gestation here for a scheduled 1LTCS 2/2 breech presentation.    Active Hospital Problems    Diagnosis  POA    Dichorionic diamniotic twin gestation [O30.049]  Yes      Resolved Hospital Problems    Diagnosis Date Resolved POA   No resolved problems to display.          Plan:      Risks, benefits, alternatives and possible complications have been discussed in detail with the patient.   - Consents signed and to chart  - To OR for 1LTCS  - Ancef on call to OR   - Draw CBC, T&S, Hep B, HIV  - Notify Staff      Post-Partum Hemorrhage risk - low          iBrd Robles M.D.  PGY-2 OBGYN  [phone]

## 2017-02-06 NOTE — IP AVS SNAPSHOT
[facility] [place], operated by [facility]  [address] [place], [place] [address]  Phone: [phone]           Patient Discharge Instructions     Our goal is to set you up for success. This packet includes information on your condition, medications, and your home care. It will help you to care for yourself so you don't get sicker and need to go back to the hospital.     Please ask your nurse if you have any questions.        There are many details to remember when preparing to leave the hospital. Here is what you will need to do:    1. Take your medicine. If you are prescribed medications, review your Medication List in the following pages. You may have new medications to  at the pharmacy and others that you'll need to stop taking. Review the instructions for how and when to take your medications. Talk with your doctor or nurses if you are unsure of what to do.     2. Go to your follow-up appointments. Specific follow-up information is listed in the following pages. Your may be contacted by a transition nurse or clinical provider about future appointments. Be sure we have all of the phone numbers to reach you, if needed. Please contact your provider's office if you are unable to make an appointment.     3. Watch for warning signs. Your doctor or nurse will give you detailed warning signs to watch for and when to call for assistance. These instructions may also include educational information about your condition. If you experience any of warning signs to your health, call your doctor.               [facility]  Unless otherwise directed by your provider, please contact [facility], our nurse care line that is available for 24/7 assistance.     [phone] (toll-free)    Registered nurses in the [facility] provide clinical advisement, health education, appointment booking, and other advisory services.                    ** Verify the list of medication(s) below is accurate and up to  date. Carry this with you in case of emergency. If your medications have changed, please notify your healthcare provider.             Medication List      START taking these medications        Additional Info                      ibuprofen 600 MG tablet   Commonly known as:  ADVIL,MOTRIN   Quantity:  30 tablet   Refills:  0   Dose:  600 mg    Last time this was given:  600 mg on 2/9/2017 11:42 AM   Instructions:  Take 1 tablet (600 mg total) by mouth every 6 (six) hours.     Begin Date    AM    Noon    PM    Bedtime       iron polysaccharides 150 mg iron Cap   Commonly known as:  NIFEREX   Refills:  0   Dose:  150 mg    Last time this was given:  150 mg on 2/9/2017  8:59 AM   Instructions:  Take 1 capsule (150 mg total) by mouth once daily.     Begin Date    AM    Noon    PM    Bedtime       oxycodone-acetaminophen 5-325 mg per tablet   Commonly known as:  PERCOCET   Quantity:  30 tablet   Refills:  0   Dose:  1 tablet    Last time this was given:  1 tablet on 2/9/2017  6:12 AM   Instructions:  Take 1 tablet by mouth every 4 (four) hours as needed.     Begin Date    AM    Noon    PM    Bedtime         CONTINUE taking these medications        Additional Info                      IRON (DRIED) ORAL   Refills:  0   Dose:  1 tablet    Instructions:  Take 1 tablet by mouth once daily.     Begin Date    AM    Noon    PM    Bedtime       prenatal #92-iron-FA #8-ps-dha 1.5 mg iron- 8.73 mg-6.4 mg Cpid   Commonly known as:  ENBRACE HR   Quantity:  30 each   Refills:  11   Dose:  1 tablet    Instructions:  Take 1 tablet by mouth once daily.     Begin Date    AM    Noon    PM    Bedtime            Where to Get Your Medications      These medications were sent to [facility]/pharmacy #[id number] - [place], [place] - [address]  [address] [facility] [address] [facility] [address]    Hours:  24-hours Phone:  [phone]     ibuprofen 600 MG tablet         You can get these medications from any pharmacy     Bring a paper prescription for each of these  medications     oxycodone-acetaminophen 5-325 mg per tablet       You don't need a prescription for these medications     iron polysaccharides 150 mg iron Cap                  Please bring to all follow up appointments:    1. A copy of your discharge instructions.  2. All medicines you are currently taking in their original bottles.  3. Identification and insurance card.    Please arrive 15 minutes ahead of scheduled appointment time.    Please call 24 hours in advance if you must reschedule your appointment and/or time.        Follow-up Information     Follow up with Lila Carlson MD In 6 weeks.    Specialties:  Obstetrics, Obstetrics and Gynecology    Why:  post partum, post-op check    Contact information:    [address] [address]  [phone]          Discharge Instructions     Future Orders    Activity as tolerated     Call MD for:  difficulty breathing or increased cough     Call MD for:  persistent dizziness, light-headedness, or visual disturbances     Call MD for:  persistent nausea and vomiting or diarrhea     Call MD for:  redness, tenderness, or signs of infection (pain, swelling, redness, odor or green/yellow discharge around incision site)     Call MD for:  severe persistent headache     Call MD for:  severe uncontrolled pain     Call MD for:  temperature >100.4     Diet general     Questions:    Total calories:      Fat restriction, if any:      Protein restriction, if any:      Na restriction, if any:      Fluid restriction:      Additional restrictions:          Discharge Instructions       Breastfeeding Discharge Instructions       Feed the baby at the earliest sign of hunger or comfort  o Hands to mouth, sucking motions  o Rooting or searching for something to suck on  o Dont wait for crying - it is a late sign of hunger and comfort.     The feedings may be 8-12 times per 24hrs and will not follow a schedule   Avoid pacifiers and bottles for the first 4  weeks   Alternate the breast you start the feeding with, or start with the breast that feels the fullest   Switch breasts when the baby takes himself off the breast or falls asleep   Keep offering breasts until the baby looks full, no longer gives hunger signs, and stays asleep when placed on his back in the crib   If the baby is sleepy and wont wake for a feeding, put the baby skin-to-skin dressed in a diaper against the mothers bare chest   Sleep near your baby   The baby should be positioned and latched on to the breast correctly  o Chest-to-chest, chin in the breast  o Babys lips are flipped outward  o Babys mouth is stretched open wide like a shout  o Babys sucking should feel like tugging to the mother  - The baby should be drinking at the breast:  o You should hear swallowing or gulping throughout the feeding  o You should see milk on the babys lips when he comes off the breast  o Your breasts should be softer when the baby is finished feeding  o The baby should look relaxed at the end of feedings  o After the 4th day and your milk is in:  o The babys poop should turn bright yellow and be loose, watery, and seedy  o The baby should have at least 3-4 poops the size of the palm of your hand per day  o The baby should have at least 6-8 wet diapers per day  o The urine should be light yellow in color  You should drink when you are thirsty and eat a healthy diet when you are    hungry.     Take naps to get the rest you need.   Take medications and/or drink alcohol only with permission of your obstetrician    or the babys pediatrician.  You can also call the Infant Risk Center,   ([phone]), Monday-Friday, 8am-5pm Central time, to get the most   up-to-date evidence-based information on the use of medications during   pregnancy and breastfeeding.      The baby should be examined by a pediatrician at 3-5 days of age.  Once your   milk comes in, the baby should be gaining at least ½ - 1oz each  "day and should be back to birthweight no later than 10-14 days of age.          Community Resources    [facility] Breastfeeding Warmline:  [phone]  Local [facility] clinics: provide incentives and breastpumps to eligible mothers  La Leche League International (LLLI):  mother-to-mother support group website        www.lll.org  Local La Leche League mother-to-mother support groups:        www.lanceascentify.[employer]        La Leche League [facility]         www.tita@[employer].com  Dr. Akhil Olguin website for latch videos and general information:        www.breastfeedinginc.ca  Infant Risk Center is a call center that provides information about the safety of taking medications while breastfeeding.  Call [phone], M-F, 8am-5pm, [place].  International Lactation Consultant Association provides resources for assistance:        www.ilca.org  [facility] Breastfeeding Coalition provides informationand resources for parents  and the community    http://[facility] Departmentastfeeding.org     Sonia mom provides resources for assistance:        www.nolamom.org  Partners for Healthy Babies:  7-984-986-BABY(4967)  [employer] provides a list of breastfeeding services by zip code:        www.[employer].org  Cafe au Lait:  [phone] a breastfeeding support group for women of color        Primary Diagnosis     Your primary diagnosis was:  Status Post       Admission Information     Date & Time Provider Department CSN    2017  5:33 AM Lila Carlson MD [facility] [id number]      Care Providers     Provider Role Specialty Primary office phone    Lila Carlson MD Attending Provider Obstetrics [phone]    Lila Carlson MD Surgeon  Obstetrics [phone]    Tasia Montana MD Consulting Physician  Obstetrics and Gynecology [phone]      Your Vitals Were     BP Pulse Temp Resp Height Weight    125/77 74 97.9 °F (36.6 °C) (Oral) 18 5' 5" (1.651 m) 80 kg (176 lb 5.9 oz)    " Last Period SpO2 BMI          05/18/2016 95% 29.35 kg/m2        Recent Lab Values     No lab values to display.      Pending Labs     Order Current Status    Specimen to Pathology - Surgery In process      Allergies as of 2/9/2017     No Known Allergies      Advance Directives     An advance directive is a document which, in the event you are no longer able to make decisions for yourself, tells your healthcare team what kind of treatment you do or do not want to receive, or who you would like to make those decisions for you.  If you do not currently have an advance directive, [facility] encourages you to create one.  For more information call:  [phone]), [phone] ([phone]),  or log on to [email or web address] 1 Products and Services.        Language Assistance Services     ATTENTION: Language assistance services are available, free of charge. Please call [phone].      ATENCIÓN: Si habla español, tiene a andrews disposición servicios gratuitos de asistencia lingüística. Llame al [phone].     [facility] Ý: N?u b?n nói Ti?ng Vi?t, có các d?ch v? h? tr? ngôn ng? mi?n phí dành cho b?n. G?i s? [phone].         [facility] complies with applicable Federal civil rights laws and does not discriminate on the basis of race, color, national origin, age, disability, or sex.

## 2017-02-06 NOTE — TRANSFER OF CARE
"Anesthesia Transfer of Care Note    Patient: Jaleesa Menezes    Procedure(s) Performed: Procedure(s) (LRB):  DELIVERY- SECTION (N/A)    Patient location: Labor and Delivery    Anesthesia Type: spinal    Transport from OR: Transported from OR on room air with adequate spontaneous ventilation    Post pain: adequate analgesia    Post assessment: no apparent anesthetic complications and tolerated procedure well    Post vital signs: stable    Level of consciousness: awake, alert and oriented    Nausea/Vomiting: no nausea/vomiting    Complications: none          Last vitals:   Visit Vitals    /80    Pulse 68    Temp 36.2 °C (97.2 °F)    Resp 19    Ht 5' 5" (1.651 m)    Wt 80 kg (176 lb 5.9 oz)    LMP 2016    SpO2 100%    Breastfeeding No    BMI 29.35 kg/m2     "

## 2017-02-06 NOTE — DISCHARGE INSTRUCTIONS
Breastfeeding Discharge Instructions       Feed the baby at the earliest sign of hunger or comfort  o Hands to mouth, sucking motions  o Rooting or searching for something to suck on  o Dont wait for crying - it is a late sign of hunger and comfort.     The feedings may be 8-12 times per 24hrs and will not follow a schedule   Avoid pacifiers and bottles for the first 4 weeks   Alternate the breast you start the feeding with, or start with the breast that feels the fullest   Switch breasts when the baby takes himself off the breast or falls asleep   Keep offering breasts until the baby looks full, no longer gives hunger signs, and stays asleep when placed on his back in the crib   If the baby is sleepy and wont wake for a feeding, put the baby skin-to-skin dressed in a diaper against the mothers bare chest   Sleep near your baby   The baby should be positioned and latched on to the breast correctly  o Chest-to-chest, chin in the breast  o Babys lips are flipped outward  o Babys mouth is stretched open wide like a shout  o Babys sucking should feel like tugging to the mother  - The baby should be drinking at the breast:  o You should hear swallowing or gulping throughout the feeding  o You should see milk on the babys lips when he comes off the breast  o Your breasts should be softer when the baby is finished feeding  o The baby should look relaxed at the end of feedings  o After the 4th day and your milk is in:  o The babys poop should turn bright yellow and be loose, watery, and seedy  o The baby should have at least 3-4 poops the size of the palm of your hand per day  o The baby should have at least 6-8 wet diapers per day  o The urine should be light yellow in color  You should drink when you are thirsty and eat a healthy diet when you are    hungry.     Take naps to get the rest you need.   Take medications and/or drink alcohol only with permission of your obstetrician    or the babys  pediatrician.  You can also call the Infant Risk Center,   ([phone]), Monday-Friday, 8am-5pm Central time, to get the most   up-to-date evidence-based information on the use of medications during   pregnancy and breastfeeding.      The baby should be examined by a pediatrician at 3-5 days of age.  Once your   milk comes in, the baby should be gaining at least ½ - 1oz each day and should be back to birthweight no later than 10-14 days of age.          Community Resources    [facility] Breastfeeding Warmline:  [phone]  Local [facility] clinics: provide incentives and breastpumps to eligible mothers  La Leche League International (LLLI):  mother-to-mother support group website        www.[employer].Reebee  Local La Leche League mother-to-mother support groups:        www.[employer].[facility]        La Leche League [facility]         www.tita@[employer].com  Dr. Akhil Olguin website for latch videos and general information:        www.breastfeedinginc.ca  Infant Risk Center is a call center that provides information about the safety of taking medications while breastfeeding.  Call [phone], M-F, 8am-5pm, [place].  International Lactation Consultant Association provides resources for assistance:        www.ilca.org  [place] Breastfeeding Coalition provides informationand resources for parents  and the community    http://[facility] Healthbreastfeeding.org     Sonia mom provides resources for assistance:        www.nolamom.org  Partners for Healthy Babies:  1-904-163-BABY(1017)  [facility] provides a list of breastfeeding services by zip code:        www.[facility] CenterOne[facility]ruit.Reebee  Cafe au Lait:  [phone] a breastfeeding support group for women of color

## 2017-02-06 NOTE — LACTATION NOTE
"   02/06/17 1530   Maternal Infant Assessment   Nipple(s) everted   Infant Assessment   Sucking Reflex present   Rooting Reflex present   Swallow Reflex present   LATCH Score   Latch 1-->repeated attempts, holds nipple in mouth, stimulate to suck   Audible Swallowing 2-->spontaneous and intermittent (24 hrs old)   Type Of Nipple 2-->everted (after stimulation)   Comfort (Breast/Nipple) 1-->filling, red/small blisters/bruises, mild/mod discomfort   Hold (Positioning) 1-->minimal assist, teach one side: mother does other, staff holds   Score (less than 7 for 2/more consecutive times, consult Lactation Consultant) 7   Maternal Infant Feeding   Infant Positioning clutch/"football";cross-cradle   Signs of Milk Transfer audible swallow;infant jaw motion present   Latch Assistance yes   Feeding Infant   Effective Latch During Feeding yes   Audible Swallow yes   Suck/Swallow Coordination present   Lactation Interventions   Attachment Promotion breastfeeding assistance provided;counseling provided;skin-to-skin contact encouraged   Breastfeeding Assistance assisted with positioning;feeding cue recognition promoted;feeding on demand promoted;feeding session observed;infant latch-on verified;infant suck/swallow verified;support offered   Maternal Breastfeeding Support encouragement offered;lactation counseling provided   Latch Promotion positioning assisted;infant moved to breast     "

## 2017-02-06 NOTE — ANESTHESIA PREPROCEDURE EVALUATION
2017  Jaleesa Menezes is a 31 y.o., female.     female with IUP at 37w5d who presents for scheduled primary CS of Di-Di twins.    Jaleesa Menezes is a 31 y.o. female     OB History    Para Term  AB SAB TAB Ectopic Multiple Living   4 2 2  1 1   0 2      # Outcome Date GA Lbr Santiago/2nd Weight Sex Delivery Anes PTL Lv   4 Current            3 SAB 16 8w0d             Complications: Hemorrhage   2 Term 10/19/14 40w1d 07:31 / 00:17 3.561 kg (7 lb 13.6 oz) M Vag-Spont EPI N Y      Complications: Nuchal cord      Birth Comments: Normal bili and sats   1 Term 12 39w5d  3.345 kg (7 lb 6 oz) M Vag-Spont EPI  Y          Wt Readings from Last 1 Encounters:   17 1131 80.2 kg (176 lb 12.9 oz)       BP Readings from Last 3 Encounters:   17 112/68   17 130/72   17 114/82       Patient Active Problem List   Diagnosis    S/P D&C (status post dilation and curettage)    Missed     Acute blood loss anemia    Disorder of fetal abdominal region    Dichorionic diamniotic twin pregnancy in second trimester    Positive GBS test    Dichorionic diamniotic twin gestation       Past Surgical History   Procedure Laterality Date    Dunbar tooth extraction      Dilation and curettage of uterus       missed Ab       Social History     Social History    Marital status:      Spouse name: N/A    Number of children: N/A    Years of education: N/A     Occupational History    teacher/      Social History Main Topics    Smoking status: Never Smoker    Smokeless tobacco: Not on file    Alcohol use No    Drug use: No    Sexual activity: Yes     Partners: Male     Birth control/ protection: None     Other Topics Concern    Are You Pregnant Or Think You May Be? Yes    Breast-Feeding No     Social History Narrative         Chemistry         Component Value Date/Time     01/20/2017 0927    K 4.4 01/20/2017 0927     01/20/2017 0927    CO2 22 (L) 01/20/2017 0927    BUN 8 01/20/2017 0927    CREATININE 0.7 01/20/2017 0927    GLU 93 01/20/2017 0927        Component Value Date/Time    CALCIUM 8.7 01/20/2017 0927    ALKPHOS 161 (H) 01/20/2017 0927    AST 25 01/20/2017 0927    ALT 20 01/20/2017 0927    BILITOT 0.5 01/20/2017 0927            Lab Results   Component Value Date    WBC 8.99 01/20/2017    HGB 12.6 01/20/2017    HCT 36.6 (L) 01/20/2017    MCV 91 01/20/2017     (L) 01/20/2017       No results for input(s): INR, PROTIME, APTT in the last 72 hours.    Invalid input(s): PT    [facility] Anesthesia Evaluation    I have reviewed the Patient Summary Reports.    I have reviewed the Nursing Notes.   I have reviewed the Medications.     Review of Systems  Anesthesia Hx:  Denies Family Hx of Anesthesia complications.   Denies Personal Hx of Anesthesia complications.   Cardiovascular:  Cardiovascular Normal     Pulmonary:  Pulmonary Normal    Renal/:  Renal/ Normal     Hepatic/GI:  Hepatic/GI Normal    Neurological:  Neurology Normal    Endocrine:  Endocrine Normal        Physical Exam  General:  Well nourished    Airway/Jaw/Neck:  Airway Findings: Mouth Opening: Normal Tongue: Normal  General Airway Assessment: Adult  Mallampati: III  TM Distance: Normal, at least 6 cm  Jaw/Neck Findings:  Neck ROM: Normal ROM      Dental:  Dental Findings: In tact   Chest/Lungs:  Chest/Lungs Findings: Clear to auscultation, Normal Respiratory Rate     Heart/Vascular:  Heart Findings: Rate: Normal  Rhythm: Regular Rhythm        Mental Status:  Mental Status Findings:  Cooperative, Alert and Oriented         Anesthesia Plan  Type of Anesthesia, risks & benefits discussed:  Anesthesia Type:  spinal  Patient's Preference:   Intra-op Monitoring Plan:   Intra-op Monitoring Plan Comments:   Post Op Pain Control Plan:   Post Op Pain Control Plan Comments:    Induction:   IV  Beta Blocker:  Patient is not currently on a Beta-Blocker (No further documentation required).       Informed Consent: Patient understands risks and agrees with Anesthesia plan.  Questions answered. Anesthesia consent signed with patient.  ASA Score: 2     Day of Surgery Review of History & Physical:    H&P update referred to the provider.         Ready For Surgery From Anesthesia Perspective.

## 2017-02-07 LAB
RUBV IGG SER-ACNC: 11.1 IU/ML
RUBV IGG SER-IMP: REACTIVE

## 2017-02-07 PROCEDURE — 25000003 PHARM REV CODE 250: Performed by: STUDENT IN AN ORGANIZED HEALTH CARE EDUCATION/TRAINING PROGRAM

## 2017-02-07 PROCEDURE — 11000001 HC ACUTE MED/SURG PRIVATE ROOM

## 2017-02-07 PROCEDURE — 63600175 PHARM REV CODE 636 W HCPCS: Performed by: ANESTHESIOLOGY

## 2017-02-07 PROCEDURE — 51702 INSERT TEMP BLADDER CATH: CPT

## 2017-02-07 PROCEDURE — 25000003 PHARM REV CODE 250: Performed by: ANESTHESIOLOGY

## 2017-02-07 PROCEDURE — 25000003 PHARM REV CODE 250: Performed by: OBSTETRICS & GYNECOLOGY

## 2017-02-07 PROCEDURE — 36000686 HC CESAREAN SECTION LEVEL II

## 2017-02-07 RX ORDER — IRON POLYSACCHARIDE COMPLEX 150 MG
150 CAPSULE ORAL DAILY
Status: DISCONTINUED | OUTPATIENT
Start: 2017-02-07 | End: 2017-02-09 | Stop reason: HOSPADM

## 2017-02-07 RX ORDER — IRON POLYSACCHARIDE COMPLEX 150 MG
150 CAPSULE ORAL DAILY
Refills: 0 | COMMUNITY
Start: 2017-02-07 | End: 2017-03-20

## 2017-02-07 RX ADMIN — IBUPROFEN 600 MG: 600 TABLET, FILM COATED ORAL at 09:02

## 2017-02-07 RX ADMIN — IBUPROFEN 600 MG: 600 TABLET, FILM COATED ORAL at 08:02

## 2017-02-07 RX ADMIN — DOCUSATE SODIUM 200 MG: 100 CAPSULE, LIQUID FILLED ORAL at 09:02

## 2017-02-07 RX ADMIN — Medication 150 MG: at 08:02

## 2017-02-07 RX ADMIN — IBUPROFEN 600 MG: 600 TABLET, FILM COATED ORAL at 02:02

## 2017-02-07 RX ADMIN — ACETAMINOPHEN 650 MG: 325 TABLET ORAL at 03:02

## 2017-02-07 RX ADMIN — KETOROLAC TROMETHAMINE 30 MG: 30 INJECTION, SOLUTION INTRAMUSCULAR at 03:02

## 2017-02-07 RX ADMIN — DOCUSATE SODIUM 200 MG: 100 CAPSULE, LIQUID FILLED ORAL at 08:02

## 2017-02-07 NOTE — PROGRESS NOTES
POSTPARTUM PROGRESS NOTE     Jaleesa Menezes is a 31 y.o. female POD #1 status post Primary  section at 37w5d 2/2 di-di twins with baby A being breech in a pregnancy complicated by GBS+ status. Patient is doing well this morning. She denies nausea, vomiting, fever or chills.  Patient reports mild abdominal pain that is adequately relieved by oral pain medications. Lochia is mild to moderate  and decreasing. Patient is voiding without difficulty and ambulating with no difficulty. Denies weakness, dizziness, CP and SOB.  She has passed flatus, and has not had BM.  Patient does plan to breast feed. Undecided for contraception. She declines circumcision.    Objective:       Temp:  [95.8 °F (35.4 °C)-98 °F (36.7 °C)] 97.9 °F (36.6 °C)  Pulse:  [] 72  Resp:  [16-18] 18  SpO2:  [95 %-100 %] 97 %  BP: (100-134)/(54-80) 100/54    General:   alert, appears stated age, cooperative and no distress   Lungs:   clear to auscultation bilaterally   Heart:   regular rate and rhythm, S1, S2 normal, no murmur, click, rub or gallop   Abdomen:  soft, non-tender; bowel sounds normal; no masses,  no organomegaly   Uterus:  firm located at the umblicus.        Incision: Bandage in place, clean, dry and intact   Extremities: peripheral pulses normal, no pedal edema, no clubbing or cyanosis     Lab Review  No results found for this or any previous visit (from the past 4 hour(s)).    I/O    Intake/Output Summary (Last 24 hours) at 17 0616  Last data filed at 17 2320   Gross per 24 hour   Intake             2500 ml   Output             3307 ml   Net             -807 ml        Assessment:     Patient Active Problem List   Diagnosis    S/P D&C (status post dilation and curettage)    Acute blood loss anemia    Positive GBS test    S/P  section        Plan:   1. Postpartum care:  - Patient doing well. Continue routine management and advances.  - Continue PO pain meds. Pain well controlled.  - Heme: H/h  13/38. Post 9/28.  Asymptomatic.  Will start Fe/colace  - Encourage ambulation  - Circumcision: declined  - Contraception: Deferred until 6 week postpartum visit  - Lactation: breastfeeding    2. ABLA  -VSSAF  -Will start Fe/colace and continue to monitor        Dispo: As patient meets milestones, will plan to discharge POD#3-4.    Bird Robles      Patient seen and examined.  Agree with resident assessment and plan.  Marshal Hobson Iv

## 2017-02-07 NOTE — ANESTHESIA POSTPROCEDURE EVALUATION
"Anesthesia Post Evaluation    Patient: Jaleesa Menezes    Procedure(s) Performed: Procedure(s) (LRB):  DELIVERY- SECTION (N/A)    Final Anesthesia Type: spinal  Patient location during evaluation: labor & delivery  Patient participation: Yes- Able to Participate  Level of consciousness: awake and alert and oriented  Post-procedure vital signs: reviewed and stable  Pain management: adequate  Airway patency: patent  PONV status at discharge: No PONV  Anesthetic complications: no      Cardiovascular status: blood pressure returned to baseline and hemodynamically stable  Respiratory status: spontaneous ventilation and room air  Hydration status: euvolemic  Follow-up not needed.        Visit Vitals    /73    Pulse 67    Temp 36.9 °C (98.4 °F) (Oral)    Resp 18    Ht 5' 5" (1.651 m)    Wt 80 kg (176 lb 5.9 oz)    LMP 2016    SpO2 98%    Breastfeeding Unknown    BMI 29.35 kg/m2       Pain/Wendy Score: Pain Rating Prior to Med Admin: 1 (2017  8:49 AM)  Pain Rating Post Med Admin: 0 (2017  4:20 AM)      "

## 2017-02-07 NOTE — MEDICAL/APP STUDENT
Delivery Discharge Summary  Obstetrics      Primary OB Clinician: Lila Carlson MD    Admission date: 2017  Discharge date: 2017    Disposition: To home, self care    Admit Dx:      Patient Active Problem List   Diagnosis    S/P D&C (status post dilation and curettage)    Acute blood loss anemia    Positive GBS test    S/P  section     Discharge Dx:    Patient Active Problem List   Diagnosis    S/P D&C (status post dilation and curettage)    Acute blood loss anemia    Positive GBS test    S/P  section       Procedure: , due to malpositioning of presenting twin.    Hospital Course:  Jaleesa Menezes is a 31 y.o. now , POD #*** who was admitted on 2017 at 37w5d for a scheduled  for malpositioned twins. On initial assessment, vital signs were stable and physical exam was normal. Infants were in Alex breech presentation. Patient was subsequently admitted to the OR with signed consents. Patient delivered a viable  male, and viable  female. Please see delivery note for further details. Pt was in stable condition post delivery and the patient was transferred to the Mother-Baby Unit. Her operation was complicated by acute blood loss. There was an estimated loss of 1100cc blood. On discharge day, patient's pain is controlled with oral pain medications. Pt is tolerating ambulation without SOB or CP, and PO diet without N/V. Reports lochia is mild. Denies any HA, vision changes, F/C, LE swelling. Denies any breast pain/soreness.  Pt in stable condition and ready for discharge. She has been instructed to continue her pain medications as needed and to follow up in the OB clinic in 6 weeks with her obstetrics provider.    Pertinent studies:  Postpartum CBC  Lab Results   Component Value Date    WBC 8.67 2017    HGB 9.4 (L) 2017    HCT 28.0 (L) 2017    MCV 92 2017     (L) 2017     ***    Tubal Ligation:  N/A  Feeding Method: breast  Rh Immune Globulin Given(A POS):   Rubella Vaccine Given: Pending  Tdap Vaccine Given: 16         Liseth Menezes Jaleesa [[id number]]     Delivery:    Episiotomy: None   Lacerations: None   Repair suture: None   Repair # of packets:     Blood loss (ml): 0     Birth information:  YOB: 2019   Time of birth: 7:31 AM   Sex: female   Delivery type: , Low Transverse   Gestational Age: 37w5d    Delivery Clinician:      Other providers:       Additional  information:  Forceps:    Vacuum:    Breech:    Observed anomalies      Living?:           APGARS  One minute Five minutes Ten minutes   Skin color:         Heart rate:         Grimace:         Muscle tone:         Breathing:         Totals: 9  9        Placenta: Delivered:       appearance     MICHELLE Menezesline [[id number]]     Delivery:    Episiotomy: None   Lacerations: None   Repair suture:     Repair # of packets:     Blood loss (ml): 0     Birth information:  YOB: 2019   Time of birth: 7:33 AM   Sex: male   Delivery type: , Low Transverse   Gestational Age: 37w5d    Delivery Clinician:      Other providers:       Additional  information:  Forceps:    Vacuum:    Breech:    Observed anomalies      Living?:           APGARS  One minute Five minutes Ten minutes   Skin color:         Heart rate:         Grimace:         Muscle tone:         Breathing:         Totals: 9  9        Placenta: Delivered:       appearance      Patient Instructions:   Current Discharge Medication List      START taking these medications    Details   ibuprofen (ADVIL,MOTRIN) 600 MG tablet Take 1 tablet (600 mg total) by mouth every 6 (six) hours.  Qty: 30 tablet, Refills: 0    Associated Diagnoses: S/P  section      oxycodone-acetaminophen (PERCOCET) 5-325 mg per tablet Take 1 tablet by mouth every 4 (four) hours as needed.  Qty: 30 tablet, Refills: 0    Associated Diagnoses: S/P  section          CONTINUE these medications which have NOT CHANGED    Details   FERROUS SULFATE, DRIED (IRON, DRIED, ORAL) Take 1 tablet by mouth once daily.      prenatal #92-iron-FA #8-ps-dha (ENBRACE HR) 1.5 mg iron- 8.73 mg-6.4 mg CpID Take 1 tablet by mouth once daily.  Qty: 30 each, Refills: 11             No discharge procedures on file.    ***

## 2017-02-07 NOTE — LACTATION NOTE
"Lactation rounds. Patient reports baby girl feeds well at breast and states baby boy has been doing well, but is "slower to latch on." Baby boy being supplemented with formula after breastfeeding per MD orders. Mother with no breastfeeding questions or concerns at this time. Encouraged to call lactation for assistance as needed. Voices understanding.  "

## 2017-02-07 NOTE — MEDICAL/APP STUDENT
Delivery Discharge Summary  Obstetrics      Primary OB Clinician: Dr. Lila Carlson MD    Admission date: 2017  Discharge date: 2017    Disposition: To home, self care    Admit Dx:      Patient Active Problem List   Diagnosis    S/P D&C (status post dilation and curettage)    Acute blood loss anemia    Positive GBS test    S/P  section     Discharge Dx:    Patient Active Problem List   Diagnosis    S/P D&C (status post dilation and curettage)    Acute blood loss anemia    Positive GBS test    S/P  section       Procedure: 1 LTCS 2/2 di-di twins, breech    Hospital Course:  Jaleesa Menezes is a 31 y.o. now , POD #*** who was admitted on 2017 at 37w5d for scheduled 1LTCS / breech presentation. On initial assessment, vital signs were stable and physical exam was normal. Infant A was laura breech presentation and Infant B was cephalic. Patient was subsequently admitted to labor and delivery unit with signed consents. LTCS course was managed with spinal anesthesia and 1500 mL IV fluids. LTCS*** complicated by multiple gestation, GBS+ status and PPH (EBL 1112 cc). Patient delivered two viable newborns: female (baby A) and male (baby B). Please see delivery note for further details. Pt was in stable condition post delivery and was transferred to the Mother-Baby Unit. Her postpartum course was uncomplicated. On discharge day, patient's pain is controlled with oral pain medications. Pt is tolerating ambulation without SOB or CP, and PO diet without N/V. Reports lochia was mild to moderate. Denies any HA, vision changes, F/C, LE swelling. Denies any breast pain/soreness.    Pt in stable condition and ready for discharge. She has been instructed to continue ibuprofen, iron supplements and prenatal vitamins*** as indicated as well as oxycodone-acetaminophen*** pain medications as needed and to follow up in the OB clinic in 6 weeks*** with her obstetrics  provider.    Pertinent studies:  Postpartum CBC  Lab Results   Component Value Date    WBC 8.67 2017    HGB 9.4 (L) 2017    HCT 28.0 (L) 2017    MCV 92 2017     (L) 2017       Tubal Ligation: n/a  Feeding Method: bottle  Rh Immune Globulin Given(A POS): N/A  Rubella Vaccine Given: N/A***  Tdap Vaccine Given: N/A***        Clif  Liseth Lazcano [[id number]]     Delivery:    Episiotomy: None   Lacerations: None   Repair suture: None   Repair # of packets:     Blood loss (ml): 0     Birth information:  YOB: 2019   Time of birth: 7:31 AM   Sex: female   Delivery type: , Low Transverse   Gestational Age: 37w5d    Delivery Clinician:      Other providers:       Additional  information:  Forceps:    Vacuum:    Breech:    Observed anomalies      Living?:           APGARS  One minute Five minutes Ten minutes   Skin color:         Heart rate:         Grimace:         Muscle tone:         Breathing:         Totals: 9  9        Placenta: Delivered:       appearance     MICHELLE Menezes Jaleesa [[id number]]     Delivery:    Episiotomy: None   Lacerations: None   Repair suture:     Repair # of packets:     Blood loss (ml): 0     Birth information:  YOB: 2019   Time of birth: 7:33 AM   Sex: male   Delivery type: , Low Transverse   Gestational Age: 37w5d    Delivery Clinician:      Other providers:       Additional  information:  Forceps:    Vacuum:    Breech:    Observed anomalies      Living?:           APGARS  One minute Five minutes Ten minutes   Skin color:         Heart rate:         Grimace:         Muscle tone:         Breathing:         Totals: 9  9        Placenta: Delivered:       appearance      Patient Instructions:   Current Discharge Medication List      START taking these medications    Details   ibuprofen (ADVIL,MOTRIN) 600 MG tablet Take 1 tablet (600 mg total) by mouth every 6 (six) hours.  Qty: 30 tablet, Refills: 0     Associated Diagnoses: S/P  section      iron polysaccharides (NIFEREX) 150 mg iron Cap Take 1 capsule (150 mg total) by mouth once daily.  Refills: 0      oxycodone-acetaminophen (PERCOCET) 5-325 mg per tablet Take 1 tablet by mouth every 4 (four) hours as needed.  Qty: 30 tablet, Refills: 0    Associated Diagnoses: S/P  section         CONTINUE these medications which have NOT CHANGED    Details   FERROUS SULFATE, DRIED (IRON, DRIED, ORAL) Take 1 tablet by mouth once daily.      prenatal #92-iron-FA #8-ps-dha (ENBRACE HR) 1.5 mg iron- 8.73 mg-6.4 mg CpID Take 1 tablet by mouth once daily.  Qty: 30 each, Refills: 11             No discharge procedures on file.    Joshua Cushing Brooks***

## 2017-02-08 PROCEDURE — 25000003 PHARM REV CODE 250: Performed by: OBSTETRICS & GYNECOLOGY

## 2017-02-08 PROCEDURE — 11000001 HC ACUTE MED/SURG PRIVATE ROOM

## 2017-02-08 PROCEDURE — 25000003 PHARM REV CODE 250: Performed by: STUDENT IN AN ORGANIZED HEALTH CARE EDUCATION/TRAINING PROGRAM

## 2017-02-08 RX ADMIN — DOCUSATE SODIUM 200 MG: 100 CAPSULE, LIQUID FILLED ORAL at 10:02

## 2017-02-08 RX ADMIN — IBUPROFEN 600 MG: 600 TABLET, FILM COATED ORAL at 04:02

## 2017-02-08 RX ADMIN — OXYCODONE HYDROCHLORIDE AND ACETAMINOPHEN 1 TABLET: 5; 325 TABLET ORAL at 04:02

## 2017-02-08 RX ADMIN — IBUPROFEN 600 MG: 600 TABLET, FILM COATED ORAL at 10:02

## 2017-02-08 RX ADMIN — Medication 150 MG: at 10:02

## 2017-02-08 RX ADMIN — IBUPROFEN 600 MG: 600 TABLET, FILM COATED ORAL at 03:02

## 2017-02-08 NOTE — LACTATION NOTE
02/08/17 1200   Maternal Infant Feeding   Time Spent (min) 0-15 min   Breastfeeding Education adequate infant intake;diet;importance of skin-to-skin contact   Lactation Referrals   Lactation Consult Follow up;Knowledge deficit   Lactation Interventions   Attachment Promotion counseling provided;face-to-face positioning promoted;family involvement promoted;privacy provided;role responsibility promoted;rooming-in promoted;skin-to-skin contact encouraged   Breastfeeding Assistance feeding cue recognition promoted;support offered   Maternal Breastfeeding Support encouragement offered;infant-mother separation minimized;lactation counseling provided;maternal hydration promoted;maternal nutrition promoted;maternal rest encouraged   Patient recently  baby boy; praised;  requested she call lactation for assistance with breastfeeding each baby; this consultant's [employer] phone # written on patient's dry erase board; her  at bedside; support and encouragement provided;

## 2017-02-08 NOTE — PLAN OF CARE
Problem: Patient Care Overview  Goal: Plan of Care Review  Outcome: Ongoing (interventions implemented as appropriate)    Requested patient call lactation for latch assistance; developed the following breastfeeding plan of care with patient: she will breastfeed each baby on cue until content at least 8 times in 24 hours observing for signs of milk transfer; she will wake each  baby prn; she will avoid bottles, formula and pacifiers;

## 2017-02-08 NOTE — PLAN OF CARE
Problem: Patient Care Overview  Goal: Plan of Care Review  Outcome: Ongoing (interventions implemented as appropriate)  VSS. Patient ambulating and voiding without difficulty. Patient breastfeeding independently. Pain well controlled with oral medications.

## 2017-02-08 NOTE — LACTATION NOTE
"Assisted with feeding twins, both in football and cradle holds on both breasts. Nutritive suckling and much audible swallowing noted from both babies on both breasts. Milk freely expressible with hand expression. Plan is continue to feed babies on cue, observing behaviors, output, and weights. To supplement baby boy as needed to satiety and according to MD orders. If formula supplementation continues to be required, mother to consider increasing stimulation at breast with pump and/or manual expression. Also recommend supplementing via cup feeding instead of using bottle nipple.      02/07/17 1720   Maternal Infant Assessment   Breast Density soft   Areola elastic   Nipple(s) everted   Infant Assessment   Sucking Reflex present   Rooting Reflex present   Swallow Reflex present   LATCH Score   Latch 1-->repeated attempts, holds nipple in mouth, stimulate to suck   Audible Swallowing 2-->spontaneous and intermittent (24 hrs old)   Type Of Nipple 2-->everted (after stimulation)   Comfort (Breast/Nipple) 1-->filling, red/small blisters/bruises, mild/mod discomfort   Hold (Positioning) 1-->minimal assist, teach one side: mother does other, staff holds   Score (less than 7 for 2/more consecutive times, consult Lactation Consultant) 7   Pain/Comfort Assessments   Pain Assessment Performed Yes       Number Scale   Presence of Pain denies   Pain Rating: Rest 0   Pain Rating: Activity 0   Maternal Infant Feeding   Maternal Emotional State relaxed;assist needed   Infant Positioning clutch/"football";cross-cradle   Signs of Milk Transfer audible swallow;infant jaw motion present   Time Spent (min)  min   Nipple Shape After Feeding, Left rounded   Nipple Shape After Feeding, Right rounded   Latch Assistance yes   Feeding Infant   Feeding Readiness Cues rooting   Satiety Cues cessation of sucking;sleeping after feeding   Effective Latch During Feeding yes   Audible Swallow yes   Suck/Swallow Coordination present   Lactation " Interventions   Attachment Promotion breastfeeding assistance provided;counseling provided   Breastfeeding Assistance assisted with positioning;feeding cue recognition promoted;feeding on demand promoted;feeding session observed;infant latch-on verified;infant suck/swallow verified   Maternal Breastfeeding Support encouragement offered;lactation counseling provided   Latch Promotion positioning assisted;infant moved to breast

## 2017-02-08 NOTE — PROGRESS NOTES
POSTPARTUM PROGRESS NOTE     Jaleesa Menezes is a 31 y.o. female POD #2 status post Primary  section at 37w5d 2/2 di-di twins with baby A being breech in a pregnancy complicated by GBS+ status. Patient is doing well this morning. She denies nausea, vomiting, fever or chills.  Patient reports mild abdominal pain that is adequately relieved by oral pain medications. Lochia is mild to moderate  and decreasing. Patient is voiding without difficulty and ambulating with no difficulty. Denies weakness, dizziness, CP and SOB.  She has passed flatus, and has not had BM.  Patient does plan to breast feed. Undecided for contraception. She declines circumcision.    Objective:       Temp:  [98.1 °F (36.7 °C)-98.6 °F (37 °C)] 98.6 °F (37 °C)  Pulse:  [67-85] 79  Resp:  [18] 18  SpO2:  [95 %-98 %] 95 %  BP: (119-132)/(69-79) 124/69    General:   alert, appears stated age, cooperative and no distress   Lungs:   clear to auscultation bilaterally   Heart:   regular rate and rhythm, S1, S2 normal, no murmur, click, rub or gallop   Abdomen:  soft, non-tender; bowel sounds normal; no masses,  no organomegaly   Uterus:  firm located at the umblicus.        Incision: Bandage in place, clean, dry and intact   Extremities: peripheral pulses normal, no pedal edema, no clubbing or cyanosis     Lab Review  No results found for this or any previous visit (from the past 4 hour(s)).    I/O  No intake or output data in the 24 hours ending 17 0633     Assessment:     Patient Active Problem List   Diagnosis    S/P D&C (status post dilation and curettage)    Acute blood loss anemia    Positive GBS test    S/P  section        Plan:   1. Postpartum care:  - Patient doing well. Continue routine management and advances.  - Continue PO pain meds. Pain well controlled.  - Heme: H/h . Post .  Asymptomatic.  Will start Fe/colace  - Encourage ambulation  - Circumcision: declined  - Contraception: Deferred until 6 week  postpartum visit  - Lactation: breastfeeding    2. ABLA  -VSSAF  -Will start Fe/colace and continue to monitor        Dispo: As patient meets milestones, will plan to discharge POD#3-4.    Marcus Elliott MD  OB/GYN PGY-1  Pager: [phone]

## 2017-02-09 ENCOUNTER — TELEPHONE (OUTPATIENT)
Dept: OBSTETRICS AND GYNECOLOGY | Facility: CLINIC | Age: 32
End: 2017-02-09

## 2017-02-09 VITALS
RESPIRATION RATE: 18 BRPM | WEIGHT: 176.38 LBS | DIASTOLIC BLOOD PRESSURE: 77 MMHG | SYSTOLIC BLOOD PRESSURE: 125 MMHG | HEART RATE: 74 BPM | OXYGEN SATURATION: 95 % | BODY MASS INDEX: 29.38 KG/M2 | HEIGHT: 65 IN | TEMPERATURE: 98 F

## 2017-02-09 PROCEDURE — 25000003 PHARM REV CODE 250: Performed by: OBSTETRICS & GYNECOLOGY

## 2017-02-09 PROCEDURE — 25000003 PHARM REV CODE 250: Performed by: STUDENT IN AN ORGANIZED HEALTH CARE EDUCATION/TRAINING PROGRAM

## 2017-02-09 RX ORDER — OXYCODONE AND ACETAMINOPHEN 5; 325 MG/1; MG/1
1 TABLET ORAL EVERY 4 HOURS PRN
Qty: 30 TABLET | Refills: 0 | Status: SHIPPED | OUTPATIENT
Start: 2017-02-09 | End: 2017-03-20

## 2017-02-09 RX ADMIN — Medication 150 MG: at 08:02

## 2017-02-09 RX ADMIN — IBUPROFEN 600 MG: 600 TABLET, FILM COATED ORAL at 04:02

## 2017-02-09 RX ADMIN — DOCUSATE SODIUM 200 MG: 100 CAPSULE, LIQUID FILLED ORAL at 08:02

## 2017-02-09 RX ADMIN — IBUPROFEN 600 MG: 600 TABLET, FILM COATED ORAL at 11:02

## 2017-02-09 RX ADMIN — OXYCODONE HYDROCHLORIDE AND ACETAMINOPHEN 1 TABLET: 5; 325 TABLET ORAL at 06:02

## 2017-02-09 NOTE — PROGRESS NOTES
POSTPARTUM PROGRESS NOTE     Jaleesa Menezes is a 31 y.o. female POD #3 status post Primary  section at 37w5d 2/2 di-di twins with baby A being breech in a pregnancy complicated by GBS+ status. Patient is doing well this morning. She denies nausea, vomiting, fever or chills.  Patient reports mild abdominal pain that is adequately relieved by oral pain medications. Lochia is mild and stable. Patient is voiding without difficulty and ambulating with no difficulty. Denies weakness, dizziness, CP and SOB.  She has passed flatus, and has not had BM.  Patient does plan to breast feed. Undecided for contraception. She declines circumcision.    Objective:       Temp:  [98.1 °F (36.7 °C)-98.4 °F (36.9 °C)] 98.4 °F (36.9 °C)  Pulse:  [69-83] 69  Resp:  [18] 18  BP: (115-133)/(68-77) 131/77    General:   alert, appears stated age, cooperative and no distress   Lungs:   clear to auscultation bilaterally   Heart:   regular rate and rhythm, S1, S2 normal, no murmur, click, rub or gallop   Abdomen:  soft, non-tender; bowel sounds normal; no masses,  no organomegaly   Uterus:  firm located at the umblicus.        Incision: C/d/i   Extremities: peripheral pulses normal, no pedal edema, no clubbing or cyanosis     Lab Review  No results found for this or any previous visit (from the past 4 hour(s)).    I/O  No intake or output data in the 24 hours ending 17 0631     Assessment:     Patient Active Problem List   Diagnosis    S/P D&C (status post dilation and curettage)    Acute blood loss anemia    Positive GBS test    S/P  section        Plan:   1. Postpartum care:  - Patient doing well. Continue routine management and advances.  - Continue PO pain meds. Pain well controlled.  - Heme: H/h . Post .  Asymptomatic.  On Fe/colace  - Encourage ambulation  - Circumcision: declined  - Contraception: Deferred until 6 week postpartum visit  - Lactation: breastfeeding    2. ABLA  -VSSAF  -Asymptomatic.   On Fe/colace        Dispo: As patient meets milestones, will plan to discharge today    Bird Robles M.D.  PGY-2 OBGYN  [phone]

## 2017-02-09 NOTE — TELEPHONE ENCOUNTER
----- Message from Lakisha Pritchard sent at 2/9/2017 11:53 AM CST -----  Contact:    is calling in regards of scheduling his wife's PP appt. The  states that they want to be seen in 4 weeks. The  was informed that she will be out but the NP can see them, but they want to see Aldo. The pt can be reached at [phone]. Thanks KG

## 2017-02-09 NOTE — LACTATION NOTE
02/09/17 1020   Maternal Infant Assessment   Breast Shape round;Bilateral:   Breast Density soft;filling;Bilateral:   Areola elastic;Bilateral:  (bruised)   Nipple(s) everted;graspable;Bilateral:   Nipple Symptoms tender;bilateral:   LATCH Score   Latch 2-->grasps breast, tongue down, lips flanged, rhythmic sucking  (baby girl)   Audible Swallowing 2-->spontaneous and intermittent (24 hrs old)  (baby girl)   Type Of Nipple 2-->everted (after stimulation)  (baby girl)   Comfort (Breast/Nipple) 1-->filling, red/small blisters/bruises, mild/mod discomfort  (baby girl)   Hold (Positioning) 1-->minimal assist, teach one side: mother does other, staff holds  (baby girl)   Score (less than 7 for 2/more consecutive times, consult Lactation Consultant) 8   Pain/Comfort Assessments   Pain Assessment Performed Yes       Number Scale   Presence of Pain complains of pain/discomfort   Location - Side Bilateral   Location nipple(s)   Pain Rating: Activity 3  (right nipple pain decreased to 0 withlatch assistance)   Factors that Relieve Pain relaxation techniques;repositioning   Maternal Infant Feeding   Maternal Emotional State assist needed   Infant Positioning cross-cradle   Signs of Milk Transfer audible swallow;breasts soften with feeding;infant jaw motion present   Comfort Measures Before/During Feeding infant position adjusted;latch adjusted   Time Spent (min) 30-60 min   Comfort Measures Following Feeding expressed milk applied   Nipple Shape After Feeding, Right (round)   Latch Assistance yes   Breastfeeding Education adequate infant intake;diet;importance of skin-to-skin contact;label/storage of breast milk;medication effects;milk expression, hand;prenatal vitamins continued;returning to work   Feeding Infant   Feeding Readiness Cues finger sucking;rooting   Satiety Cues cessation of sucking;infant releases breast;sleeping after feeding   Feeding Tolerance/Success alert for feeding;coordinated suck;coordinated swallow    Effective Latch During Feeding yes   Audible Swallow yes   Suck/Swallow Coordination present   Skin-to-Skin Contact During Feeding yes   Lactation Referrals   Lactation Consult Breastfeeding assessment;Follow up;Knowledge deficit   Lactation Referrals pediatric care provider;outpatient lactation program   Lactation Interventions   Attachment Promotion breastfeeding assistance provided;counseling provided;face-to-face positioning promoted;family involvement promoted;infant-mother separation minimized;privacy provided;role responsibility promoted;rooming-in promoted;skin-to-skin contact encouraged   Breast Care: Breastfeeding lanolin to nipple(s) applied   Breastfeeding Assistance assisted with positioning;feeding cue recognition promoted;feeding session observed;infant latch-on verified;infant suck/swallow verified;milk expression/pumping;support offered   Maternal Breastfeeding Support diary/feeding log utilized;encouragement offered;infant-mother separation minimized;lactation counseling provided;maternal hydration promoted;maternal nutrition promoted;maternal rest encouraged   Latch Promotion positioning assisted;infant moved to breast   With patient's permission assisted with breastfeeding baby GIRL on right breast; baby actively sucking with wide mouth pauses and  until content; provided lactation discharge education; reviewed lactation packet, lactation education and first alert form in mother/baby care guide; requested patient call lactation for assistance with breastfeeding baby BOY;

## 2017-02-09 NOTE — DISCHARGE SUMMARY
Delivery Discharge Summary  Obstetrics      Primary OB Clinician: Dr. Lila Carlson MD    Admission date: 2017  Discharge date: 2017    Disposition: To home, self care    Admit Dx:      Patient Active Problem List   Diagnosis    S/P D&C (status post dilation and curettage)    Acute blood loss anemia    Positive GBS test    S/P  section     Discharge Dx:    Patient Active Problem List   Diagnosis    S/P D&C (status post dilation and curettage)    Acute blood loss anemia    Positive GBS test    S/P  section       Procedure: 1 LTCS 2/2 di-di twins, breech    Hospital Course:  Jaleesa Menezes is a 31 y.o. now , POD #3 who was admitted on 2017 at 37w5d for scheduled 1LTCS / breech presentation. On initial assessment, vital signs were stable and physical exam was normal. Infant A was laura breech presentation and Infant B was cephalic. Patient was subsequently admitted to labor and delivery unit with signed consents.  She was taken to the OR for LTCS.  Patient delivered two viable newborns: female (baby A) and male (baby B). Please see delivery note for further details. Pt was in stable condition post delivery and was transferred to the Mother-Baby Unit. Her postpartum course was uncomplicated. On discharge day, patient's pain is controlled with oral pain medications. Pt is tolerating ambulation without SOB or CP, and PO diet without N/V. Reports lochia was mild to moderate. Denies any HA, vision changes, F/C, LE swelling. Denies any breast pain/soreness.    Pt in stable condition and ready for discharge. She has been instructed to continue ibuprofen, iron supplements and prenatal vitamins as indicated as well as oxycodone-acetaminophen pain medications as needed and to follow up in the OB clinic in 6 weeks with her obstetrics provider.    Pertinent studies:  Postpartum CBC  Lab Results   Component Value Date    WBC 8.67 2017    HGB 9.4 (L) 2017     HCT 28.0 (L) 2017    MCV 92 2017     (L) 2017       Tubal Ligation: n/a  Feeding Method: bottle  Rh Immune Globulin Given(A POS): N/A  Rubella Vaccine Given: N/A  Tdap Vaccine Given: Ordered for d/c         Liseth Menezes [[id number]]     Delivery:    Episiotomy: None   Lacerations: None   Repair suture: None   Repair # of packets:     Blood loss (ml): 0     Birth information:  YOB: 2019   Time of birth: 7:31 AM   Sex: female   Delivery type: , Low Transverse   Gestational Age: 37w5d    Delivery Clinician:      Other providers:       Additional  information:  Forceps:    Vacuum:    Breech:    Observed anomalies      Living?:           APGARS  One minute Five minutes Ten minutes   Skin color:         Heart rate:         Grimace:         Muscle tone:         Breathing:         Totals: 9  9        Placenta: Delivered:       appearance     MICHELEL Menezes [[id number]]     Delivery:    Episiotomy: None   Lacerations: None   Repair suture:     Repair # of packets:     Blood loss (ml): 0     Birth information:  YOB: 2019   Time of birth: 7:33 AM   Sex: male   Delivery type: , Low Transverse   Gestational Age: 37w5d    Delivery Clinician:      Other providers:       Additional  information:  Forceps:    Vacuum:    Breech:    Observed anomalies      Living?:           APGARS  One minute Five minutes Ten minutes   Skin color:         Heart rate:         Grimace:         Muscle tone:         Breathing:         Totals: 9  9        Placenta: Delivered:       appearance      Patient Instructions:   Current Discharge Medication List      START taking these medications    Details   ibuprofen (ADVIL,MOTRIN) 600 MG tablet Take 1 tablet (600 mg total) by mouth every 6 (six) hours.  Qty: 30 tablet, Refills: 0    Associated Diagnoses: S/P  section      iron polysaccharides (NIFEREX) 150 mg iron Cap Take 1 capsule (150 mg total) by mouth  once daily.  Refills: 0      oxycodone-acetaminophen (PERCOCET) 5-325 mg per tablet Take 1 tablet by mouth every 4 (four) hours as needed.  Qty: 30 tablet, Refills: 0    Associated Diagnoses: S/P  section         CONTINUE these medications which have NOT CHANGED    Details   FERROUS SULFATE, DRIED (IRON, DRIED, ORAL) Take 1 tablet by mouth once daily.      prenatal #92-iron-FA #8-ps-dha (ENBRACE HR) 1.5 mg iron- 8.73 mg-6.4 mg CpID Take 1 tablet by mouth once daily.  Qty: 30 each, Refills: 11               Discharge Procedure Orders  Diet general     Activity as tolerated     Call MD for:  temperature >100.4     Call MD for:  persistent nausea and vomiting or diarrhea     Call MD for:  severe uncontrolled pain     Call MD for:  redness, tenderness, or signs of infection (pain, swelling, redness, odor or green/yellow discharge around incision site)     Call MD for:  difficulty breathing or increased cough     Call MD for:  severe persistent headache     Call MD for:  persistent dizziness, light-headedness, or visual disturbances         Bird Robles M.D.  PGY-2 OBGYN  [phone]

## 2017-02-09 NOTE — PROGRESS NOTES
D/C instructions given to father and mother. Mother Baby care-guide reviewed.   Questions answered. No distress noted.  Discharged home, transported to [facility] via escort.

## 2017-02-09 NOTE — PLAN OF CARE
Problem: Patient Care Overview  Goal: Plan of Care Review  Outcome: Ongoing (interventions implemented as appropriate)  Developed the following breastfeeding plan of care with patient: She  will breastfeed each baby on cue until content at least 8 times in 24 hours observing for signs of milk transfer; she will wake each baby prn; she will avoid bottles, formula and pacifiers;

## 2017-02-12 ENCOUNTER — NURSE TRIAGE (OUTPATIENT)
Dept: ADMINISTRATIVE | Facility: CLINIC | Age: 32
End: 2017-02-12

## 2017-02-12 NOTE — TELEPHONE ENCOUNTER
Reason for Disposition   Maternal illness: questions about  (all triage questions negative)   Sore or cracked nipples: questions about (all triage questions negative)   Blocked milk ducts (tender lumps in the breast), questions about (all triage questions negative)    Protocols used: ST BREAST-FEEDING [id number]    Mom has tenderness and cracked/scabbed nipple of the right breast. The smaller twin has latching issues and mom is supplementing with formula to rest her breast and work out the knots in the right breast. Mom's temp is 100.6 oral. She is drinking plenty of water at about 8000 ml of water per day (10 of those water pitchers given to post partum moms after delivery). Mom is taking oxycodone to manage the pain from csection and breast feeding as order. Call back scheduled to see if mom was able to work out the knots in right breast.      1655--called back to check on the patient. Left message.     Mrs. Menezes changed positions to feed from the right breast and she did the warm compresses before breastfeeding from the right breast and the tenderness has subsiding.

## 2017-02-13 ENCOUNTER — PATIENT MESSAGE (OUTPATIENT)
Dept: OBSTETRICS AND GYNECOLOGY | Facility: CLINIC | Age: 32
End: 2017-02-13

## 2017-02-13 ENCOUNTER — NURSE TRIAGE (OUTPATIENT)
Dept: ADMINISTRATIVE | Facility: CLINIC | Age: 32
End: 2017-02-13

## 2017-02-13 DIAGNOSIS — N61.0 MASTITIS: Primary | ICD-10-CM

## 2017-02-14 ENCOUNTER — TELEPHONE (OUTPATIENT)
Dept: OBSTETRICS AND GYNECOLOGY | Facility: CLINIC | Age: 32
End: 2017-02-14

## 2017-02-14 ENCOUNTER — TELEPHONE (OUTPATIENT)
Dept: LACTATION | Facility: CLINIC | Age: 32
End: 2017-02-14

## 2017-02-14 RX ORDER — DICLOXACILLIN SODIUM 250 MG/1
250 CAPSULE ORAL 4 TIMES DAILY
Qty: 40 CAPSULE | Refills: 0 | Status: SHIPPED | OUTPATIENT
Start: 2017-02-14 | End: 2017-02-24

## 2017-02-14 NOTE — TELEPHONE ENCOUNTER
----- Message from Ortiz Fitch sent at 2/14/2017  9:15 AM CST -----  Contact: pt  x_ 1st Request   _ 2nd Request   _ 3rd Request     Who: CONG SHAW [[id number]]    Why: Pt is requesting a call back today in reference to breast pain and needs to know if she should continue to breast feed.    What Number to Call Back: [phone] or [phone]    When to Expect a call back: (Before the end of the day)   -- if call after 3:00 call back will be tomorrow.

## 2017-02-14 NOTE — TELEPHONE ENCOUNTER
Pt c/o cracked nipples and a little puss has come out. Advised pt to call lactation. Pt verbalized understanding

## 2017-02-14 NOTE — TELEPHONE ENCOUNTER
Pt states she had several correspondence with OB staff today. Was told a Rx for an antibiotic would be called in .  Medication not called in to pharmacy. Not  Noted in EPIC. Offered to contact on call provider. Pt states she willl contact OB office when opens in the morning

## 2017-02-14 NOTE — TELEPHONE ENCOUNTER
Patient left message on warmline with breastfeeding concerns. Phone call returned.   Nipple damage reported to base of right nipple, and what seems to have been a plugged duct passed from right breast. (Patient initially described it as what she thought was pus, but description resembles that of a passed plugged duct.) No current fever, redness, or hard area noted to breast at this time per patient's report, which is an improvement. Describes a semi-circular crack at base of right nipple. Asked about what to put on nipple, and suggested applying expressed milk and allowing to air dry followed by application of medicated ointment (either APNO prescription or OTC version). May rest right nipple, allowing time to heal, by strictly pumping for 12-24 hours and try babies back at breast when she feels comfortable doing so. Reviewed proper flange fit while pumping. Reviewed use of hot or cold compresses and massage/compression during pumping to increase milk removal and recommended pumping right breast at least every 3 hours. Stressed ensuring adequate latch when babies resume latching on right breast. To call lactation warmline as needed with further questions/concerns. Voices understanding.

## 2017-02-14 NOTE — TELEPHONE ENCOUNTER
Reason for Disposition   Caller requesting a NON-URGENT new prescription or refill and triager unable to refill per unit policy    Protocols used: ST MEDICATION QUESTION CALL-A-AH

## 2017-03-03 ENCOUNTER — PATIENT MESSAGE (OUTPATIENT)
Dept: OBSTETRICS AND GYNECOLOGY | Facility: CLINIC | Age: 32
End: 2017-03-03

## 2017-03-20 ENCOUNTER — POSTPARTUM VISIT (OUTPATIENT)
Dept: OBSTETRICS AND GYNECOLOGY | Facility: CLINIC | Age: 32
End: 2017-03-20
Attending: OBSTETRICS & GYNECOLOGY
Payer: COMMERCIAL

## 2017-03-20 VITALS
HEIGHT: 65 IN | WEIGHT: 149.06 LBS | BODY MASS INDEX: 24.83 KG/M2 | SYSTOLIC BLOOD PRESSURE: 130 MMHG | DIASTOLIC BLOOD PRESSURE: 70 MMHG

## 2017-03-20 PROCEDURE — 99999 PR PBB SHADOW E&M-EST. PATIENT-LVL III: CPT | Mod: PBBFAC,,, | Performed by: OBSTETRICS & GYNECOLOGY

## 2017-03-20 PROCEDURE — 0503F POSTPARTUM CARE VISIT: CPT | Mod: S$GLB,,, | Performed by: OBSTETRICS & GYNECOLOGY

## 2017-03-20 RX ORDER — DESOGESTREL AND ETHINYL ESTRADIOL 21-5 (28)
1 KIT ORAL DAILY
Qty: 28 TABLET | Refills: 11 | Status: SHIPPED | OUTPATIENT
Start: 2017-03-20 | End: 2017-07-26 | Stop reason: SDUPTHER

## 2017-03-20 NOTE — MR AVS SNAPSHOT
[facility] - [facility] [address]  [address]  [place] [address]  Phone: [phone]  Fax: [phone]                  Jaleesa Menezes   3/20/2017 10:00 AM   Postpartum Visit    Description:  Female : 1985   Provider:  Lila Carlson MD   Department:  [facility] - OB/[facility] [address]           Reason for Visit     postpartum                To Do List           Goals (5 Years of Data)     None      [facility]     [facility] Nurse Care Line -  Assistance  Registered nurses in the [facility] Center provide clinical advisement, health education, appointment booking, and other advisory services.  Call for this free service at [phone].             Medications           Message regarding Medications     Verify the changes and/or additions to your medication regime listed below are the same as discussed with your clinician today.  If any of these changes or additions are incorrect, please notify your healthcare provider.        STOP taking these medications     ibuprofen (ADVIL,MOTRIN) 600 MG tablet Take 1 tablet (600 mg total) by mouth every 6 (six) hours.    FERROUS SULFATE, DRIED (IRON, DRIED, ORAL) Take 1 tablet by mouth once daily.    iron polysaccharides (NIFEREX) 150 mg iron Cap Take 1 capsule (150 mg total) by mouth once daily.    oxycodone-acetaminophen (PERCOCET) 5-325 mg per tablet Take 1 tablet by mouth every 4 (four) hours as needed.    prenatal #92-iron-FA #8-ps-dha (ENBRACE HR) 1.5 mg iron- 8.73 mg-6.4 mg CpID Take 1 tablet by mouth once daily.           Verify that the below list of medications is an accurate representation of the medications you are currently taking.  If none reported, the list may be blank. If incorrect, please contact your healthcare provider. Carry this list with you in case of emergency.           Current Medications            Clinical Reference Information           Prenatal Vitals     Enc. Date GA Prenatal Vitals Prenatal  "Pulse Pain Level Urine Albumin/Glucose Edema Presentation Dilation/Effacement/Station    3/20/17 37w5d 130/70 / 67.6 kg (149 lb 0.5 oz)           2/6/17 37w5d Admission Dx: Dichorionic diamniotic twin gestation Dept: [facility]    2/2/17 37w1d 112/68 / 80.2 kg (176 lb 12.9 oz) 41 cm / 136/148 / Present  0 Negative / Negative +1 / +1      1/26/17 36w1d 130/72 / 81.4 kg (179 lb 7.3 oz) 40 cm / 132/144 / Present  0 Negative / Negative +1 / +1      1/20/17 35w2d 114/82 / 81.3 kg (179 lb 3.7 oz) 39 cm / 138/141 / Present  0 Negative / Negative +1 / +1 / None / No  0 / 40 / -4    1/12/17 34w1d 112/64 / 79 kg (174 lb 2.6 oz) 38 cm / 138/152 / Present  0 Negative / Negative None / None      12/29/16 32w1d 100/68 / 77.2 kg (170 lb 3.1 oz) 36 cm / 138/129 / Present  0 Negative / Negative None / None      12/23/16 31w2d 114/72 / 74.9 kg (165 lb 2 oz)           12/12/16 29w5d 110/62 / 75.2 kg (165 lb 12.6 oz) 33 cm / 142/155 / Present  0 Negative / Negative None / None      11/8/16 24w6d 112/64 / 72.9 kg (160 lb 11.5 oz) 29 cm / 137/154 / Present  0 Negative / Trace None / None / None / No      10/10/16 20w5d 110/62 / 70.6 kg (155 lb 10.3 oz)  / 152/132 / Present  0 Negative / Negative None / None      9/12/16 16w5d 114/62 / 67.9 kg (149 lb 11.1 oz)  / 154/144 / Present  0 Negative / Negative None / None      8/15/16 12w5d 112/60 / 63.9 kg (140 lb 14 oz)  / 152/148  0 Negative / Negative None / None         Number of babies: 2   Height: 5' 5" (1.651 m)       Your Vitals Were     BP Height Weight Last Period BMI    130/70 5' 5" (1.651 m) 67.6 kg (149 lb 0.5 oz) 05/18/2016 24.8 kg/m2      Allergies as of 3/20/2017     No Known Allergies      Immunizations Administered on Date of Encounter - 3/20/2017     None      Language Assistance Services     ATTENTION: Language assistance services are available, free of charge. Please call [phone].      ATENCIÓN: Si habla español, tiene a andrews disposición servicios gratuitos de " asistencia lingüística. Raza dhillon [phone].     RILEY Ý: N?u b?n nói Ti?ng Vi?t, có các d?ch v? h? tr? ngôn ng? mi?n phí dành cho b?n. G?i s? [phone].         [facility] [address] complies with applicable Federal civil rights laws and does not discriminate on the basis of race, color, national origin, age, disability, or sex.

## 2017-03-21 NOTE — PROGRESS NOTES
Jaleesa Menezes is a 31 y.o. female  presents for a postpartum visit.  She is status post C/s 6 weeks ago.  Her hospitalization was not complicated.  She is not breastfeeding.  She desires oral contraceptives (estrogen/progesterone) for contraception.  She denies postpartum depressio    History reviewed. No pertinent past medical history.  Past Surgical History:   Procedure Laterality Date    DILATION AND CURETTAGE OF UTERUS      missed Ab    WISDOM TOOTH EXTRACTION       Review of patient's allergies indicates:  No Known Allergies    Current Outpatient Prescriptions:     desog-e.estradiol/e.estradiol (KARIVA) 0.15-0.02 mgx21 /0.01 mg x 5 per tablet, Take 1 tablet by mouth once daily., Disp: 28 tablet, Rfl: 11      Vitals:    17 1010   BP: 130/70       GENERAL: healthy  ABDOMEN: no masses, hepatosplenomegaly, no hernias  EXTERNAL GENITALIA POSTPARTUM: normal, well-healed, without lesions or masses   VAGINA POSTPARTUM: normal, well-healed, physiologic discharge, without lesions   CERVIX POSTPARTUM: normal, well-healed, without lesions   UTERUS POSTPARTUM: normal size, well involuted, firm, non-tender, ADNEXA POSTPARTUM: no masses palpable and nontender    Assessment:  Normal postpartum exam    Plan:  Routine follow up.

## 2017-04-09 ENCOUNTER — PATIENT MESSAGE (OUTPATIENT)
Dept: OBSTETRICS AND GYNECOLOGY | Facility: CLINIC | Age: 32
End: 2017-04-09

## 2017-06-05 ENCOUNTER — PATIENT MESSAGE (OUTPATIENT)
Dept: OBSTETRICS AND GYNECOLOGY | Facility: CLINIC | Age: 32
End: 2017-06-05

## 2017-06-15 NOTE — TELEPHONE ENCOUNTER
Patient was schedule for her annual on 7-26-17 @8:30am also wants to discuss contraception option. Patient verbalized understanding.

## 2017-07-26 ENCOUNTER — OFFICE VISIT (OUTPATIENT)
Dept: OBSTETRICS AND GYNECOLOGY | Facility: CLINIC | Age: 32
End: 2017-07-26
Attending: OBSTETRICS & GYNECOLOGY
Payer: COMMERCIAL

## 2017-07-26 VITALS
WEIGHT: 143.94 LBS | BODY MASS INDEX: 23.98 KG/M2 | DIASTOLIC BLOOD PRESSURE: 72 MMHG | HEIGHT: 65 IN | SYSTOLIC BLOOD PRESSURE: 118 MMHG

## 2017-07-26 DIAGNOSIS — Z30.9 ENCOUNTER FOR CONTRACEPTIVE MANAGEMENT, UNSPECIFIED TYPE: Primary | ICD-10-CM

## 2017-07-26 DIAGNOSIS — Z12.4 SCREENING FOR MALIGNANT NEOPLASM OF THE CERVIX: ICD-10-CM

## 2017-07-26 PROCEDURE — 99214 OFFICE O/P EST MOD 30 MIN: CPT | Mod: S$GLB,,, | Performed by: ADVANCED PRACTICE MIDWIFE

## 2017-07-26 PROCEDURE — 99999 PR PBB SHADOW E&M-EST. PATIENT-LVL III: CPT | Mod: PBBFAC,,, | Performed by: ADVANCED PRACTICE MIDWIFE

## 2017-07-26 PROCEDURE — 88175 CYTOPATH C/V AUTO FLUID REDO: CPT

## 2017-07-26 PROCEDURE — 3008F BODY MASS INDEX DOCD: CPT | Mod: S$GLB,,, | Performed by: ADVANCED PRACTICE MIDWIFE

## 2017-07-26 RX ORDER — DESOGESTREL AND ETHINYL ESTRADIOL 21-5 (28)
1 KIT ORAL DAILY
Qty: 28 TABLET | Refills: 11 | Status: SHIPPED | OUTPATIENT
Start: 2017-07-26 | End: 2018-08-31

## 2017-08-06 NOTE — PROGRESS NOTES
HISTORY OF PRESENT ILLNESS:    Jaleesa Menezes is a 32 y.o. female, , Patient's last menstrual period was 07/10/2017.,  presents for a routine annual exam . Pt has no complaints or concerns.    History reviewed. No pertinent past medical history.    Past Surgical History:   Procedure Laterality Date    DILATION AND CURETTAGE OF UTERUS      missed Ab    WISDOM TOOTH EXTRACTION         MEDICATIONS AND ALLERGIES:      Current Outpatient Prescriptions:     desog-e.estradiol/e.estradiol (KARIVA) 0.15-0.02 mgx21 /0.01 mg x 5 per tablet, Take 1 tablet by mouth once daily., Disp: 28 tablet, Rfl: 11    Review of patient's allergies indicates:  No Known Allergies    Family History   Problem Relation Age of Onset    Asthma Son     Psoriasis Father     Breast cancer Neg Hx     Cancer Neg Hx     Ovarian cancer Neg Hx     Melanoma Neg Hx     Colon cancer Neg Hx        Social History     Social History    Marital status:      Spouse name: N/A    Number of children: N/A    Years of education: N/A     Occupational History    teacher/      Social History Main Topics    Smoking status: Never Smoker    Smokeless tobacco: Never Used    Alcohol use No    Drug use: No    Sexual activity: Yes     Partners: Male     Birth control/ protection: None     Other Topics Concern    Are You Pregnant Or Think You May Be? Yes    Breast-Feeding No     Social History Narrative    No narrative on file       COMPREHENSIVE GYN HISTORY:  PAP History: Denies abnormal Paps.  Infection History: Denies STDs. Denies PID.  Benign History: Denies uterine fibroids. Denies ovarian cysts. Denies endometriosis. Denies other conditions.  Cancer History: Denies cervical cancer. Denies uterine cancer or hyperplasia. Denies ovarian cancer. Denies vulvar cancer or pre-cancer. Denies vaginal cancer or pre-cancer. Denies breast cancer. Denies colon cancer.  Sexual Activity History:  currently  sexually  "active  Menstrual History:   Dysmenorrhea History:  Contraception:    ROS:  GENERAL: No weight changes. No swelling. No fatigue. No fever.  CARDIOVASCULAR: No chest pain. No shortness of breath. No leg cramps.   NEUROLOGICAL: No headaches. No vision changes.  BREASTS: No pain. No lumps. No discharge.  ABDOMEN: No pain. No nausea. No vomiting. No diarrhea. No constipation.  REPRODUCTIVE: No abnormal bleeding.   VULVA: No pain. No lesions. No itching.  VAGINA: No relaxation. No itching. No odor. No discharge. No lesions.  URINARY: No incontinence. No nocturia. No frequency. No dysuria.    /72   Ht 5' 5" (1.651 m)   Wt 65.3 kg (143 lb 15.4 oz)   LMP 07/10/2017   BMI 23.96 kg/m²     PE:  APPEARANCE: Well nourished, well developed, in no acute distress.  AFFECT: WNL, alert and oriented x 3. Interactive during exam  SKIN: No acne or hirsutism.  NECK: Neck symmetric, without masses or thyromegaly.  NODES: No inguinal, axillary or femoral lymph node enlargement.  CHEST: Good respiratory effort.   ABDOMEN: Soft. No tenderness or masses. No hepatosplenomegaly. No hernias.  BREASTS: Symmetrical, no skin changes, visible lesions, palpable masses or nipple discharge bilaterally.  PELVIC: External female genitalia without lesions.  Female hair distribution. Adequate perineal body, Normal urethral meatus. Vagina moist and well rugated without lesions or discharge.  No significant cystocele or rectocele present. Cervix pink without lesions, discharge or tenderness. Uterus is 4-6 week size, regular, mobile and nontender. Adnexa without masses or tenderness.  EXTREMITIES: No edema    PROCEDURES:  Pap    DIAGNOSIS:  1. Gyn exam    LABS AND TESTS ORDERED:    MEDICATIONS PRESCRIBED:    COUNSELING:  The patient was counseled today on:  -A.C.S. Pap and pelvic exam guidelines, recomendations for yearly mammogram, monthly self breast exams and to follow up with her PCP for other health maintenance.  Pt undecided whether continuing " on OCPs vs  getting vasectomy. Rx refilled with instructions.    FOLLOW-UP - annually.

## 2017-09-05 ENCOUNTER — PATIENT MESSAGE (OUTPATIENT)
Dept: OBSTETRICS AND GYNECOLOGY | Facility: CLINIC | Age: 32
End: 2017-09-05

## 2018-07-29 ENCOUNTER — PATIENT MESSAGE (OUTPATIENT)
Dept: OBSTETRICS AND GYNECOLOGY | Facility: CLINIC | Age: 33
End: 2018-07-29

## 2018-07-30 ENCOUNTER — PATIENT MESSAGE (OUTPATIENT)
Dept: OBSTETRICS AND GYNECOLOGY | Facility: CLINIC | Age: 33
End: 2018-07-30

## 2018-07-30 DIAGNOSIS — N89.8 VAGINAL ITCHING: Primary | ICD-10-CM

## 2018-07-30 RX ORDER — CLOTRIMAZOLE 1 %
CREAM (GRAM) TOPICAL 2 TIMES DAILY
Qty: 30 G | Refills: 0 | Status: SHIPPED | OUTPATIENT
Start: 2018-07-30 | End: 2022-02-14

## 2018-07-30 RX ORDER — DESONIDE 0.5 MG/G
OINTMENT TOPICAL 2 TIMES DAILY
Qty: 30 G | Refills: 0 | Status: SHIPPED | OUTPATIENT
Start: 2018-07-30 | End: 2022-02-14

## 2018-08-31 ENCOUNTER — OFFICE VISIT (OUTPATIENT)
Dept: OBSTETRICS AND GYNECOLOGY | Facility: CLINIC | Age: 33
End: 2018-08-31
Attending: OBSTETRICS & GYNECOLOGY
Payer: COMMERCIAL

## 2018-08-31 VITALS
DIASTOLIC BLOOD PRESSURE: 68 MMHG | SYSTOLIC BLOOD PRESSURE: 118 MMHG | BODY MASS INDEX: 22.73 KG/M2 | HEIGHT: 65 IN | WEIGHT: 136.44 LBS

## 2018-08-31 DIAGNOSIS — N91.1 AMENORRHEA, SECONDARY: ICD-10-CM

## 2018-08-31 DIAGNOSIS — N89.8 VAGINAL ITCHING: Primary | ICD-10-CM

## 2018-08-31 DIAGNOSIS — Z01.419 ENCOUNTER FOR GYNECOLOGICAL EXAMINATION WITHOUT ABNORMAL FINDING: ICD-10-CM

## 2018-08-31 DIAGNOSIS — Z12.4 SCREENING FOR MALIGNANT NEOPLASM OF CERVIX: ICD-10-CM

## 2018-08-31 LAB
CANDIDA RRNA VAG QL PROBE: NEGATIVE
G VAGINALIS RRNA GENITAL QL PROBE: NEGATIVE
T VAGINALIS RRNA GENITAL QL PROBE: NEGATIVE

## 2018-08-31 PROCEDURE — 88175 CYTOPATH C/V AUTO FLUID REDO: CPT

## 2018-08-31 PROCEDURE — 87660 TRICHOMONAS VAGIN DIR PROBE: CPT

## 2018-08-31 PROCEDURE — 87624 HPV HI-RISK TYP POOLED RSLT: CPT

## 2018-08-31 PROCEDURE — 99395 PREV VISIT EST AGE 18-39: CPT | Mod: S$GLB,,, | Performed by: OBSTETRICS & GYNECOLOGY

## 2018-08-31 RX ORDER — FLUCONAZOLE 100 MG/1
100 TABLET ORAL DAILY
Qty: 4 TABLET | Refills: 0 | Status: SHIPPED | OUTPATIENT
Start: 2018-08-31 | End: 2018-08-31 | Stop reason: SDUPTHER

## 2018-08-31 RX ORDER — FLUCONAZOLE 100 MG/1
100 TABLET ORAL DAILY
Qty: 4 TABLET | Refills: 0 | Status: SHIPPED | OUTPATIENT
Start: 2018-08-31 | End: 2018-09-04

## 2018-08-31 RX ORDER — SULFAMETHOXAZOLE AND TRIMETHOPRIM 400; 80 MG/1; MG/1
1 TABLET ORAL 2 TIMES DAILY
Qty: 20 TABLET | Refills: 0 | Status: SHIPPED | OUTPATIENT
Start: 2018-08-31 | End: 2018-08-31 | Stop reason: SDUPTHER

## 2018-08-31 RX ORDER — MEDROXYPROGESTERONE ACETATE 10 MG/1
10 TABLET ORAL DAILY
Qty: 30 TABLET | Refills: 1 | Status: SHIPPED | OUTPATIENT
Start: 2018-08-31 | End: 2018-08-31 | Stop reason: SDUPTHER

## 2018-08-31 RX ORDER — MEDROXYPROGESTERONE ACETATE 10 MG/1
10 TABLET ORAL DAILY
Qty: 30 TABLET | Refills: 1 | Status: SHIPPED | OUTPATIENT
Start: 2018-08-31 | End: 2022-02-14

## 2018-08-31 RX ORDER — SULFAMETHOXAZOLE AND TRIMETHOPRIM 400; 80 MG/1; MG/1
1 TABLET ORAL 2 TIMES DAILY
Qty: 20 TABLET | Refills: 0 | Status: SHIPPED | OUTPATIENT
Start: 2018-08-31 | End: 2018-09-10

## 2018-08-31 NOTE — PROGRESS NOTES
SUBJECTIVE:   33 y.o. female   for annual routine Pap and checkup. No LMP recorded..  She reports vaignal itching and irritation since July. She tried anit fungal and steroid combination- helpedsome. She reports redness on her skin- near her rectum and above her vagina. .    She stopped OCPs and does not have a period    History reviewed. No pertinent past medical history.  Past Surgical History:   Procedure Laterality Date    DILATION AND CURETTAGE OF UTERUS      missed Ab    WISDOM TOOTH EXTRACTION       Social History     Socioeconomic History    Marital status:      Spouse name: Not on file    Number of children: Not on file    Years of education: Not on file    Highest education level: Not on file   Social Needs    Financial resource strain: Not on file    Food insecurity - worry: Not on file    Food insecurity - inability: Not on file    Transportation needs - medical: Not on file    Transportation needs - non-medical: Not on file   Occupational History    Occupation: teacher/   Tobacco Use    Smoking status: Never Smoker    Smokeless tobacco: Never Used   Substance and Sexual Activity    Alcohol use: No    Drug use: No    Sexual activity: Yes     Partners: Male     Birth control/protection: None   Other Topics Concern    Are you pregnant or think you may be? Yes    Breast-feeding No   Social History Narrative    Not on file     Family History   Problem Relation Age of Onset    Asthma Son     Psoriasis Father     Breast cancer Neg Hx     Cancer Neg Hx     Ovarian cancer Neg Hx     Melanoma Neg Hx     Colon cancer Neg Hx      OB History    Para Term  AB Living   5 3 3   1 4   SAB TAB Ectopic Multiple Live Births   1     1 3      # Outcome Date GA Lbr Santiago/2nd Weight Sex Delivery Anes PTL Lv   5A Term 17 37w5d  2.835 kg (6 lb 4 oz) F CS-LTranv Spinal     5B Term 17 37w5d  2.353 kg (5 lb 3 oz) M CS-LTranv Spinal  BEVERLY   4 SAB 16  8w0d             Complications: Hemorrhage   3 Term 10/19/14 40w1d 07:31 / 00:17 3.561 kg (7 lb 13.6 oz) M Vag-Spont EPI N BEVERLY      Complications: Nuchal cord      Birth Comments: Normal bili and sats   2 Term 12 39w5d  3.345 kg (7 lb 6 oz) M Vag-Spont EPI  BEVERLY   1                        Current Outpatient Medications   Medication Sig Dispense Refill    clotrimazole (LOTRIMIN) 1 % cream Apply topically 2 (two) times daily. 30 g 0    desonide 0.05% (DESOWEN) 0.05 % Oint Apply topically 2 (two) times daily. for 10 days 30 g 0    fluconazole (DIFLUCAN) 100 MG tablet Take 2 tablets by mouth today. Then 1 tablet daily for 3 days 4 tablet 0    medroxyPROGESTERone (PROVERA) 10 MG tablet Take 1 tablet (10 mg total) by mouth once daily. 30 tablet 1    sulfamethoxazole-trimethoprim 400-80mg (BACTRIM) 400-80 mg per tablet Take 1 tablet by mouth 2 (two) times daily for 10 days 20 tablet 0     No current facility-administered medications for this visit.      Allergies: Patient has no known allergies.     The ASCVD Risk score (Agawam DC Jr., et al., 2013) failed to calculate for the following reasons:    The 2013 ASCVD risk score is only valid for ages 40 to 79      ROS:  Constitutional: no weight loss, weight gain, fever, fatigue  Eyes:  No vision changes, glasses/contacts  ENT/Mouth: No ulcers, sinus problems, ears ringing, headache  Cardiovascular: No inability to lie flat, chest pain, exercise intolerance, swelling, heart palpitations  Respiratory: No wheezing, coughing blood, shortness of breath, or cough  Gastrointestinal: No diarrhea, bloody stool, nausea/vomiting, constipation, gas, hemorrhoids  Genitourinary: No blood in urine, painful urination, urgency of urination, frequency of urination, incomplete emptying, incontinence, abnormal bleeding, painful periods, heavy periods, vaginal discharge, vaginal odor, painful intercourse, sexual problems, bleeding after intercourse, +vaginal  itching  Musculoskeletal: No muscle weakness  Skin/Breast: No painful breasts, nipple discharge, masses, rash, ulcers  Neurological: No passing out, seizures, numbness, headache  Endocrine: No diabetes, hypothyroid, hyperthyroid, hot flashes, hair loss, abnormal hair growth, acne  Psychiatric: No depression, crying  Hematologic: No bruises, bleeding, swollen lymph nodes, anemia.      Physical Exam:   Constitutional: She is oriented to person, place, and time. She appears well-developed and well-nourished.      Neck: Normal range of motion. No tracheal deviation present. No thyromegaly present.    Cardiovascular: Exam reveals no edema.     Pulmonary/Chest: Effort normal. She exhibits no mass, no tenderness, no deformity and no retraction. Right breast exhibits no inverted nipple, no mass, no nipple discharge, no skin change, no tenderness, presence, no bleeding and no swelling. Left breast exhibits no inverted nipple, no mass, no nipple discharge, no skin change, no tenderness, presence, no bleeding and no swelling. Breasts are symmetrical.        Abdominal: Soft. She exhibits no distension and no mass. There is no tenderness. There is no rebound and no guarding. No hernia. Hernia confirmed negative in the left inguinal area.     Genitourinary: Vagina normal and uterus normal. Rectal exam shows no external hemorrhoid.       There is no rash, tenderness or lesion on the right labia. There is no rash, tenderness or lesion on the left labia. Uterus is not deviated. Cervix is normal. No no adexnal prolapse. Right adnexum displays no mass, no tenderness and no fullness. Left adnexum displays no mass, no tenderness and no fullness. No tenderness, bleeding, rectocele, cystocele or unspecified prolapse of vaginal walls in the vagina. No vaginal discharge found. Cervix exhibits no motion tenderness, no discharge and no friability.           Musculoskeletal: Normal range of motion and moves all extremeties. She exhibits no  edema.      Lymphadenopathy:        Right: No inguinal adenopathy present.        Left: No inguinal adenopathy present.    Neurological: She is alert and oriented to person, place, and time.    Skin: No rash noted. No erythema. No pallor.    Psychiatric: She has a normal mood and affect. Her behavior is normal. Judgment and thought content normal.     + erythema on left aspect of mons and right perineum -    ASSESSMENT:   well woman  Vaginal erythema  Vaginal itching  Amenorrhea- secondary  PLAN:   pap smear  Counseled patient that her skin is also concerning for staph infection which she has had in the past- Recommend oral Bactrim and Diflucan  If no improvement in 1 week - will refer to vulva clinic  Recommend withdrawal bleed 4 times per year with Provera- patient to notify me if she does not bleed  return annually or prn

## 2018-09-07 ENCOUNTER — PATIENT MESSAGE (OUTPATIENT)
Dept: OBSTETRICS AND GYNECOLOGY | Facility: CLINIC | Age: 33
End: 2018-09-07

## 2018-09-07 LAB
HPV HR 12 DNA CVX QL NAA+PROBE: NEGATIVE
HPV16 AG SPEC QL: NEGATIVE
HPV18 DNA SPEC QL NAA+PROBE: NEGATIVE

## 2018-09-10 ENCOUNTER — TELEPHONE (OUTPATIENT)
Dept: OBSTETRICS AND GYNECOLOGY | Facility: CLINIC | Age: 33
End: 2018-09-10

## 2018-09-10 NOTE — TELEPHONE ENCOUNTER
----- Message from Kadie Combs MA sent at 9/10/2018  8:56 AM CDT -----  Dr Carlson would like to refer this pt to vulva clinic Vaginal erythema and Vaginal itching. Dr Carlson gave her some medication but pt is not better.   Thanks Kadie

## 2018-09-11 ENCOUNTER — TELEPHONE (OUTPATIENT)
Dept: OBSTETRICS AND GYNECOLOGY | Facility: CLINIC | Age: 33
End: 2018-09-11

## 2018-09-19 ENCOUNTER — PATIENT MESSAGE (OUTPATIENT)
Dept: OBSTETRICS AND GYNECOLOGY | Facility: CLINIC | Age: 33
End: 2018-09-19

## 2018-09-20 ENCOUNTER — PATIENT MESSAGE (OUTPATIENT)
Dept: OBSTETRICS AND GYNECOLOGY | Facility: CLINIC | Age: 33
End: 2018-09-20

## 2018-09-20 DIAGNOSIS — B37.9 YEAST INFECTION: Primary | ICD-10-CM

## 2018-09-20 RX ORDER — TERCONAZOLE 4 MG/G
1 CREAM VAGINAL NIGHTLY
Qty: 30 G | Refills: 0 | Status: SHIPPED | OUTPATIENT
Start: 2018-09-20 | End: 2018-09-27

## 2018-09-20 NOTE — TELEPHONE ENCOUNTER
Pt called waiting to see Dr Joce CALI but does not have a appointment until  October. Pt states the itching and burning is driving her crazy. Pt tried Monistat 1 and it seem to help. Advised pt spoke with dr. Carlson and will send in terazol 7.

## 2018-09-24 ENCOUNTER — TELEPHONE (OUTPATIENT)
Dept: OBSTETRICS AND GYNECOLOGY | Facility: CLINIC | Age: 33
End: 2018-09-24

## 2018-09-24 DIAGNOSIS — N89.8 VAGINAL ITCHING: Primary | ICD-10-CM

## 2018-09-24 RX ORDER — CLOBETASOL PROPIONATE 0.5 MG/G
OINTMENT TOPICAL 2 TIMES DAILY
Qty: 30 G | Refills: 1 | Status: SHIPPED | OUTPATIENT
Start: 2018-09-24 | End: 2018-10-04

## 2018-09-24 NOTE — TELEPHONE ENCOUNTER
----- Message from Chiara Victoria sent at 9/24/2018  3:51 PM CDT -----  Contact: self  Patient is requesting a call back to discuss some questions that she has. Patient can be reached at [phone].

## 2018-09-24 NOTE — TELEPHONE ENCOUNTER
Pt c/o vaginal itching and burning and would like something else called in. Advised pt will call in clobetasol

## 2018-09-24 NOTE — TELEPHONE ENCOUNTER
----- Message from Vee Barron sent at 9/24/2018  4:53 PM CDT -----  Contact: pt            Name of Who is Calling: pt      What is the request in detail: pt returned the nurse's phone call. Please call pt      Can the clinic reply by [facility]: no      What [facility]: [phone]

## 2018-09-28 ENCOUNTER — TELEPHONE (OUTPATIENT)
Dept: OBSTETRICS AND GYNECOLOGY | Facility: CLINIC | Age: 33
End: 2018-09-28

## 2018-09-28 NOTE — TELEPHONE ENCOUNTER
Called pt to check to see how she was feeling with using the clobetasol. Pt states she is feel so much better. Still having a little bit of vaginal itching but it is bearble . Advised pt to keep the appointment with Dr Joce CALI . Pt verbalized understanding

## 2018-10-09 ENCOUNTER — OFFICE VISIT (OUTPATIENT)
Dept: OBSTETRICS AND GYNECOLOGY | Facility: CLINIC | Age: 33
End: 2018-10-09
Attending: OBSTETRICS & GYNECOLOGY
Payer: COMMERCIAL

## 2018-10-09 ENCOUNTER — PATIENT MESSAGE (OUTPATIENT)
Dept: OBSTETRICS AND GYNECOLOGY | Facility: CLINIC | Age: 33
End: 2018-10-09

## 2018-10-09 VITALS
HEIGHT: 65 IN | DIASTOLIC BLOOD PRESSURE: 64 MMHG | BODY MASS INDEX: 22.41 KG/M2 | WEIGHT: 134.5 LBS | SYSTOLIC BLOOD PRESSURE: 118 MMHG

## 2018-10-09 DIAGNOSIS — R21 VULVAR RASH: Primary | ICD-10-CM

## 2018-10-09 DIAGNOSIS — N89.8 VAGINAL ITCHING: ICD-10-CM

## 2018-10-09 DIAGNOSIS — B37.31 CANDIDIASIS OF VULVA AND VAGINA: ICD-10-CM

## 2018-10-09 DIAGNOSIS — N90.89 VULVAR IRRITATION: ICD-10-CM

## 2018-10-09 PROCEDURE — 99999 PR PBB SHADOW E&M-EST. PATIENT-LVL III: CPT | Mod: PBBFAC,,, | Performed by: OBSTETRICS & GYNECOLOGY

## 2018-10-09 PROCEDURE — 87102 FUNGUS ISOLATION CULTURE: CPT

## 2018-10-09 PROCEDURE — 87210 SMEAR WET MOUNT SALINE/INK: CPT | Mod: QW,S$GLB,, | Performed by: OBSTETRICS & GYNECOLOGY

## 2018-10-09 PROCEDURE — 99244 OFF/OP CNSLTJ NEW/EST MOD 40: CPT | Mod: 25,S$GLB,, | Performed by: OBSTETRICS & GYNECOLOGY

## 2018-10-09 NOTE — PROGRESS NOTES
Subjective:     Patient ID: Jaleesa Menezes is a 33 y.o. female.     Chief Complaint: Vulvar Itch (ref Dr Carlson)     History of Present Illness: This patient is a 33 y.o. female, who presents to the GYN Vulva clinic for evaluation of a red, irritating vulvar rash(secondary to a possible contact irritant) which cleared up with the use of clobetasol ointment.    No LMP recorded.    Review of Systems    GENERAL: No fever, chills, fatigability or weightchange  SKIN: No rashes, itching or changes in color or texture of skin.  HEAD: No headaches or recent head trauma.  EYES: Visual acuity fine. No photophobia,r diplopia.  EARS: Denies earache or vertigo  NOSE: No loss of smell, no epistaxis or postnasal drip.  MOUTH & THROAT: No hoarseness or change in voice.   NODES: Denies swollen glands.  CHEST: Denies SCOTT, cyanosis, wheezing, cough and sputum production.  CARDIOVASCULAR: Denies chest pain, PND, orthopnea or reduced exercise tolerance.  ABDOMEN: Appetite fine. No weight loss. bloating, Denies diarrhea, abdominal pain, hematemesis or blood in stool.  URINARY: No flank pain, dysuria or hematuria.  PERIPHERAL VASCULAR: No claudication or cyanosis.Varicosities  MUSCULOSKELETAL: No joint stiffness or swelling. Denies back pain.muscle aches  NEUROLOGIC: No history of seizures, paralysis, alteration of gait or coordination.       Objective:       Physical Exam     APPEARANCE: Well nourished, well developed, in no acute distress.    GENITOURINARY:  Vulva: No lesions. Normal female genital architecture.  No rash noted  Urethral Meatus: Normal size and location, no lesions, no prolapse.  Urethra: No masses, tenderness, prolapse or scarring.  Vagina:  Moist with rugae, no discharge, no significant cystocele or rectocele.  Cervix: No lesions, normal diameter, no stenosis, no cervical motion tenderness. .  Uterus: 6 week size, regular shape, mobile, non-tender, normal position, good support.  Adnexa: No masses, tenderness or  CDS nodularity.  Anus Perineum: No lesions, no relaxation, no external hemorrhoids.  Abdomen: No masses, tenderness, hernia or ascites, no hepatasplenomegaly  Skin: No rashes, lesions, ulcers, acne, hirsutism.  Peripheral/lower extremities: No edema, erythema or tenderness.  Lymphatic: No axillary, neck or groin nodes palp.  Mental Status: Alert, oriented x 3, normal affect and mood.          @PROCEDURE:@  Wet Prep:  pH = 4.0  -WBCS = rare   -Lactobacilli = decreased but present  -BV = Amsel negative  -Candida = Hyphae none seen  -Trichomnas = none seen  -Cells- Basal and Parabasal, Superficial: Maturation:  Superficial cells predominate  -Impression:  Negative wet prep  -Treatment:  No therapy indicated at this time  -[facility] in [place]       Assessment:      1. Vulvar rash    2. Vulvar irritation    3. Vaginal itching    4. Candidiasis of vulva and vagina               Plan:  1.  Discuss the possibility of a contact type of dermatitis based on the patient's history and response to an anti-inflammatory (clobetasol).  The patient was informed that she needs to be very aware of what may have come in contact with her vulva, if the rash or a irritation redeveloped.  2.  Continue using the clobetasol ointment intermittently as needed.  3.  Candida culture of the vagina taken today.  4.  Dr. Carlson notified of my findings.  5.  Return to clinic as needed for revaluation.                      Orders Placed This Encounter   Procedures    Fungus culture

## 2018-11-08 LAB — FUNGUS SPEC CULT: NORMAL

## 2019-01-23 ENCOUNTER — TELEPHONE (OUTPATIENT)
Dept: OBSTETRICS AND GYNECOLOGY | Facility: CLINIC | Age: 34
End: 2019-01-23

## 2019-01-23 NOTE — TELEPHONE ENCOUNTER
Pt called c/o a hernia above her belly button that seems to be getting bigger. Pt states it is the size of a golf ball and is throbbing. Advised pt spoke with Dr Carlson and she recommends see Dr kulwinder Francisco [phone]

## 2019-01-23 NOTE — TELEPHONE ENCOUNTER
----- Message from Kye Hilario sent at 1/23/2019  1:20 PM CST -----  Contact: CONG SHAW [[id number]]  Name of Who is Calling: CONG SHAW [[id number]]       What is the request in detail: Patient called and request a call back from staff in regards to a personal issue. Please advise       Can the clinic reply by [facility]: No       What Number to Call Back if not in [facility]: [phone]

## 2019-11-18 ENCOUNTER — OFFICE VISIT (OUTPATIENT)
Dept: OBSTETRICS AND GYNECOLOGY | Facility: CLINIC | Age: 34
End: 2019-11-18
Attending: OBSTETRICS & GYNECOLOGY
Payer: COMMERCIAL

## 2019-11-18 VITALS
WEIGHT: 137.13 LBS | BODY MASS INDEX: 22.85 KG/M2 | DIASTOLIC BLOOD PRESSURE: 72 MMHG | HEIGHT: 65 IN | SYSTOLIC BLOOD PRESSURE: 122 MMHG

## 2019-11-18 DIAGNOSIS — Z01.419 ENCOUNTER FOR GYNECOLOGICAL EXAMINATION WITHOUT ABNORMAL FINDING: Primary | ICD-10-CM

## 2019-11-18 PROCEDURE — 99395 PR PREVENTIVE VISIT,EST,18-39: ICD-10-PCS | Mod: S$GLB,,, | Performed by: OBSTETRICS & GYNECOLOGY

## 2019-11-18 PROCEDURE — 99395 PREV VISIT EST AGE 18-39: CPT | Mod: S$GLB,,, | Performed by: OBSTETRICS & GYNECOLOGY

## 2019-11-18 RX ORDER — CLOBETASOL PROPIONATE 0.5 MG/G
OINTMENT TOPICAL 2 TIMES DAILY
Qty: 45 G | Refills: 5 | Status: SHIPPED | OUTPATIENT
Start: 2019-11-18 | End: 2022-02-14

## 2019-11-18 NOTE — PROGRESS NOTES
SUBJECTIVE:   34 y.o. female   for annual routine checkup. Patient's last menstrual period was 10/14/2019..  She Is seeing Dermatology for psoriasis.  She has been on Otezla and Humira last year.    When she saw me last and member-she had not had a cycle she took Provera and had a withdrawal bleed  With the different medications she has been on for psoriasis she has had some irregularities in.  Her cycle her periods were regular on nodes has low but she had to stop.  Medication secondary to insurance issues she just restarted in September  She reports clobetasol helped with her vaginal itching  Past Medical History:   Diagnosis Date    Psoriasis      Past Surgical History:   Procedure Laterality Date    DILATION AND CURETTAGE OF UTERUS      missed Ab    WISDOM TOOTH EXTRACTION       Social History     Socioeconomic History    Marital status:      Spouse name: Not on file    Number of children: Not on file    Years of education: Not on file    Highest education level: Not on file   Occupational History    Occupation: teacher/   Social Needs    Financial resource strain: Not on file    Food insecurity:     Worry: Not on file     Inability: Not on file    Transportation needs:     Medical: Not on file     Non-medical: Not on file   Tobacco Use    Smoking status: Never Smoker    Smokeless tobacco: Never Used   Substance and Sexual Activity    Alcohol use: No    Drug use: No    Sexual activity: Yes     Partners: Male     Birth control/protection: None, Partner-Vasectomy   Lifestyle    Physical activity:     Days per week: Not on file     Minutes per session: Not on file    Stress: Not on file   Relationships    Social connections:     Talks on phone: Not on file     Gets together: Not on file     Attends Jainism service: Not on file     Active member of club or organization: Not on file     Attends meetings of clubs or organizations: Not on file     Relationship status:  Not on file   Other Topics Concern    Are you pregnant or think you may be? Yes    Breast-feeding No   Social History Narrative    Not on file     Family History   Problem Relation Age of Onset    Asthma Son     Psoriasis Father     Breast cancer Neg Hx     Cancer Neg Hx     Ovarian cancer Neg Hx     Melanoma Neg Hx     Colon cancer Neg Hx      OB History    Para Term  AB Living   5 3 3   1 4   SAB TAB Ectopic Multiple Live Births   1     1 3      # Outcome Date GA Lbr Santiago/2nd Weight Sex Delivery Anes PTL Lv   5A Term 17 37w5d  2.835 kg (6 lb 4 oz) F CS-LTranv Spinal     5B Term 17 37w5d  2.353 kg (5 lb 3 oz) M CS-LTranv Spinal  BEVERLY   4 SAB 16 8w0d             Complications: Hemorrhage   3 Term 10/19/14 40w1d 07:31 / 00:17 3.561 kg (7 lb 13.6 oz) M Vag-Spont EPI N BEVERLY      Birth Comments: Normal bili and sats      Complications: Nuchal cord   2 Term 12 39w5d  3.345 kg (7 lb 6 oz) M Vag-Spont EPI  BEVERLY   1                     Current Outpatient Medications   Medication Sig Dispense Refill    apremilast (OTEZLA) 30 mg Tab Take 30 mg by mouth 2 (two) times daily.      clobetasol 0.05% (TEMOVATE) 0.05 % Oint Apply topically 2 (two) times daily. for 10 days 30 g 1    clotrimazole (LOTRIMIN) 1 % cream Apply topically 2 (two) times daily. (Patient not taking: Reported on 2019) 30 g 0    desonide 0.05% (DESOWEN) 0.05 % Oint Apply topically 2 (two) times daily. for 10 days 30 g 0    medroxyPROGESTERone (PROVERA) 10 MG tablet Take 1 tablet (10 mg total) by mouth once daily. 30 tablet 1     No current facility-administered medications for this visit.      Allergies: Patient has no known allergies.     The ASCVD Risk score (Jakobsky BRYAN Jr., et al., 2013) failed to calculate for the following reasons:    The 2013 ASCVD risk score is only valid for ages 40 to 79      ROS:  Constitutional: no weight loss, weight gain, fever, fatigue  Eyes:  No vision changes,  glasses/contacts  ENT/Mouth: No ulcers, sinus problems, ears ringing, headache  Cardiovascular: No inability to lie flat, chest pain, exercise intolerance, swelling, heart palpitations  Respiratory: No wheezing, coughing blood, shortness of breath, or cough  Gastrointestinal: No diarrhea, bloody stool, nausea/vomiting, constipation, gas, hemorrhoids  Genitourinary: No blood in urine, painful urination, urgency of urination, frequency of urination, incomplete emptying, incontinence, abnormal bleeding, painful periods, heavy periods, vaginal discharge, vaginal odor, painful intercourse, sexual problems, bleeding after intercourse, +irregular cycles.  Musculoskeletal: No muscle weakness  Skin/Breast: No painful breasts, nipple discharge, masses, rash, ulcers, +psoriasis  Neurological: No passing out, seizures, numbness, headache  Endocrine: No diabetes, hypothyroid, hyperthyroid, hot flashes, hair loss, abnormal hair growth, acne  Psychiatric: No depression, crying  Hematologic: No bruises, bleeding, swollen lymph nodes, anemia.      Physical Exam:   Constitutional: She is oriented to person, place, and time. She appears well-developed and well-nourished.      Neck: Normal range of motion. No tracheal deviation present. No thyromegaly present.    Cardiovascular: Exam reveals no edema.     Pulmonary/Chest: Effort normal. She exhibits no mass, no tenderness, no deformity and no retraction. Right breast exhibits no inverted nipple, no mass, no nipple discharge, no skin change, no tenderness, presence, no bleeding and no swelling. Left breast exhibits no inverted nipple, no mass, no nipple discharge, no skin change, no tenderness, presence, no bleeding and no swelling. Breasts are symmetrical.        Abdominal: Soft. She exhibits no distension and no mass. There is no tenderness. There is no rebound and no guarding. No hernia. Hernia confirmed negative in the left inguinal area.     Genitourinary: Vagina normal and uterus  normal. Rectal exam shows no external hemorrhoid. There is no rash, tenderness or lesion on the right labia. There is no rash, tenderness or lesion on the left labia. Uterus is not deviated. Cervix is normal. No no adexnal prolapse. Right adnexum displays no mass, no tenderness and no fullness. Left adnexum displays no mass, no tenderness and no fullness. No tenderness, bleeding, rectocele, cystocele or unspecified prolapse of vaginal walls in the vagina. No vaginal discharge found. Cervix exhibits no motion tenderness, no discharge and no friability.           Musculoskeletal: Normal range of motion and moves all extremeties. She exhibits no edema.      Lymphadenopathy:        Right: No inguinal adenopathy present.        Left: No inguinal adenopathy present.    Neurological: She is alert and oriented to person, place, and time.    Skin: No rash noted. No erythema. No pallor.    Psychiatric: She has a normal mood and affect. Her behavior is normal. Judgment and thought content normal.         ASSESSMENT:   well woman  Irregular menses  PLAN:  Counseled patient to track her cycles- if she is getting more than 1 month or is having heavy prolonged bleeding she is to notify me and we will schedule her for an endometrial biopsy and an ultrasound   return annually or prn

## 2020-11-18 ENCOUNTER — OFFICE VISIT (OUTPATIENT)
Dept: OBSTETRICS AND GYNECOLOGY | Facility: CLINIC | Age: 35
End: 2020-11-18
Attending: OBSTETRICS & GYNECOLOGY
Payer: COMMERCIAL

## 2020-11-18 VITALS — WEIGHT: 142 LBS | DIASTOLIC BLOOD PRESSURE: 70 MMHG | SYSTOLIC BLOOD PRESSURE: 112 MMHG | BODY MASS INDEX: 23.63 KG/M2

## 2020-11-18 DIAGNOSIS — Z01.419 ENCOUNTER FOR GYNECOLOGICAL EXAMINATION WITHOUT ABNORMAL FINDING: Primary | ICD-10-CM

## 2020-11-18 PROCEDURE — 99395 PREV VISIT EST AGE 18-39: CPT | Mod: S$GLB,,, | Performed by: OBSTETRICS & GYNECOLOGY

## 2020-11-18 PROCEDURE — 1126F AMNT PAIN NOTED NONE PRSNT: CPT | Mod: S$GLB,,, | Performed by: OBSTETRICS & GYNECOLOGY

## 2020-11-18 PROCEDURE — 3008F BODY MASS INDEX DOCD: CPT | Mod: CPTII,S$GLB,, | Performed by: OBSTETRICS & GYNECOLOGY

## 2020-11-18 PROCEDURE — 1126F PR PAIN SEVERITY QUANTIFIED, NO PAIN PRESENT: ICD-10-PCS | Mod: S$GLB,,, | Performed by: OBSTETRICS & GYNECOLOGY

## 2020-11-18 PROCEDURE — 3008F PR BODY MASS INDEX (BMI) DOCUMENTED: ICD-10-PCS | Mod: CPTII,S$GLB,, | Performed by: OBSTETRICS & GYNECOLOGY

## 2020-11-18 PROCEDURE — 99395 PR PREVENTIVE VISIT,EST,18-39: ICD-10-PCS | Mod: S$GLB,,, | Performed by: OBSTETRICS & GYNECOLOGY

## 2020-11-18 RX ORDER — CLOBETASOL PROPIONATE 0.5 MG/G
OINTMENT TOPICAL 2 TIMES DAILY
Qty: 30 G | Refills: 3 | Status: SHIPPED | OUTPATIENT
Start: 2020-11-18 | End: 2022-02-14

## 2020-11-18 NOTE — PROGRESS NOTES
SUBJECTIVE:   35 y.o. female   for annual routine checkup. Patient's last menstrual period was 10/23/2020..  She has no unusual complaints.    She reports having a clogged milk duct this summer that resolved    Past Medical History:   Diagnosis Date    Psoriasis      Past Surgical History:   Procedure Laterality Date    DILATION AND CURETTAGE OF UTERUS      missed Ab    WISDOM TOOTH EXTRACTION       Social History     Socioeconomic History    Marital status:      Spouse name: Not on file    Number of children: Not on file    Years of education: Not on file    Highest education level: Not on file   Occupational History    Occupation: teacher/   Social Needs    Financial resource strain: Not on file    Food insecurity     Worry: Not on file     Inability: Not on file    Transportation needs     Medical: Not on file     Non-medical: Not on file   Tobacco Use    Smoking status: Never Smoker    Smokeless tobacco: Never Used   Substance and Sexual Activity    Alcohol use: No    Drug use: No    Sexual activity: Yes     Partners: Male     Birth control/protection: None, Partner-Vasectomy   Lifestyle    Physical activity     Days per week: Not on file     Minutes per session: Not on file    Stress: Not on file   Relationships    Social connections     Talks on phone: Not on file     Gets together: Not on file     Attends [employer] service: Not on file     Active member of club or organization: Not on file     Attends meetings of clubs or organizations: Not on file     Relationship status: Not on file   Other Topics Concern    Are you pregnant or think you may be? Yes    Breast-feeding No   Social History Narrative    Not on file     Family History   Problem Relation Age of Onset    Asthma Son     Psoriasis Father     Breast cancer Neg Hx     Cancer Neg Hx     Ovarian cancer Neg Hx     Melanoma Neg Hx     Colon cancer Neg Hx      OB History    Para Term   AB Living   5 3 3   1 4   SAB TAB Ectopic Multiple Live Births   1     1 3      # Outcome Date GA Lbr Santiago/2nd Weight Sex Delivery Anes PTL Lv   5A Term 17 37w5d  2.835 kg (6 lb 4 oz) F CS-LTranv Spinal     5B Term 17 37w5d  2.353 kg (5 lb 3 oz) M CS-LTranv Spinal  BEVERLY   4 SAB 16 8w0d             Complications: Hemorrhage   3 Term 10/19/14 40w1d 07:31 / 00:17 3.561 kg (7 lb 13.6 oz) M Vag-Spont EPI N BEVERLY      Birth Comments: Normal bili and sats      Complications: Nuchal cord   2 Term 12 39w5d  3.345 kg (7 lb 6 oz) M Vag-Spont EPI  BEVERLY   1                     Current Outpatient Medications   Medication Sig Dispense Refill    apremilast (OTEZLA) 30 mg Tab Take 30 mg by mouth 2 (two) times daily.      clobetasol 0.05% (TEMOVATE) 0.05 % Oint Apply topically 2 (two) times daily. 45 g 5    clotrimazole (LOTRIMIN) 1 % cream Apply topically 2 (two) times daily. (Patient not taking: Reported on 2019) 30 g 0    desonide 0.05% (DESOWEN) 0.05 % Oint Apply topically 2 (two) times daily. for 10 days 30 g 0    medroxyPROGESTERone (PROVERA) 10 MG tablet Take 1 tablet (10 mg total) by mouth once daily. 30 tablet 1     No current facility-administered medications for this visit.      Allergies: Patient has no known allergies.     The ASCVD Risk score ([place] DC Jr., et al., 2013) failed to calculate for the following reasons:    The 2013 ASCVD risk score is only valid for ages 40 to 79      ROS:  Constitutional: no weight loss, weight gain, fever, fatigue  Eyes:  No vision changes, glasses/contacts  ENT/Mouth: No ulcers, sinus problems, ears ringing, headache  Cardiovascular: No inability to lie flat, chest pain, exercise intolerance, swelling, heart palpitations  Respiratory: No wheezing, coughing blood, shortness of breath, or cough  Gastrointestinal: No diarrhea, bloody stool, nausea/vomiting, constipation, gas, hemorrhoids  Genitourinary: No blood in urine, painful urination, urgency of  urination, frequency of urination, incomplete emptying, incontinence, abnormal bleeding, painful periods, heavy periods, vaginal discharge, vaginal odor, painful intercourse, sexual problems, bleeding after intercourse.  Musculoskeletal: No muscle weakness  Skin/Breast: No painful breasts, nipple discharge, masses, rash, ulcers  Neurological: No passing out, seizures, numbness, headache  Endocrine: No diabetes, hypothyroid, hyperthyroid, hot flashes, hair loss, abnormal hair growth, acne  Psychiatric: No depression, crying  Hematologic: No bruises, bleeding, swollen lymph nodes, anemia.      Physical Exam:   Constitutional: She is oriented to person, place, and time. She appears well-developed and well-nourished.      Neck: Normal range of motion. No tracheal deviation present. No thyromegaly present.    Cardiovascular: Exam reveals no edema.     Pulmonary/Chest: Effort normal. She exhibits no mass, no tenderness, no deformity and no retraction. Right breast exhibits no inverted nipple, no mass, no nipple discharge, no skin change, no tenderness, presence, no bleeding and no swelling. Left breast exhibits no inverted nipple, no mass, no nipple discharge, no skin change, no tenderness, presence, no bleeding and no swelling. Breasts are symmetrical.        Abdominal: Soft. She exhibits no distension and no mass. There is no abdominal tenderness. There is no rebound and no guarding. No hernia. Hernia confirmed negative in the left inguinal area.     Genitourinary:    Vagina and uterus normal.   Rectum:      No external hemorrhoid.   There is no rash, tenderness or lesion on the right labia. There is no rash, tenderness or lesion on the left labia. Uterus is not deviated. Cervix is normal. No no adexnal prolapse. Right adnexum displays no mass, no tenderness and no fullness. Left adnexum displays no mass, no tenderness and no fullness. No tenderness, bleeding, rectocele, cystocele or unspecified prolapse of vaginal  walls in the vagina. Cervix exhibits no motion tenderness, no discharge and no friability. negative for vaginal discharge          Musculoskeletal: Normal range of motion and moves all extremeties. No edema.       Neurological: She is alert and oriented to person, place, and time.    Skin: No rash noted. No erythema. No pallor.    Psychiatric: She has a normal mood and affect. Her behavior is normal. Judgment and thought content normal.         ASSESSMENT:   well woman    PLAN:   Patient requests refill on Clobetasol  return annually or prn

## 2021-04-26 ENCOUNTER — PATIENT MESSAGE (OUTPATIENT)
Dept: RESEARCH | Facility: HOSPITAL | Age: 36
End: 2021-04-26

## 2021-05-26 NOTE — TELEPHONE ENCOUNTER
Pt c/o temp going back up again to 100.4 and sweating. Pt denies chills and redness has gotten better. Advised pt per Dr. Carlson will call in dicloxacillin if symptoms get worse pt will need to be seen.    Residential stability

## 2022-02-14 ENCOUNTER — OFFICE VISIT (OUTPATIENT)
Dept: OBSTETRICS AND GYNECOLOGY | Facility: CLINIC | Age: 37
End: 2022-02-14
Attending: OBSTETRICS & GYNECOLOGY
Payer: COMMERCIAL

## 2022-02-14 VITALS
DIASTOLIC BLOOD PRESSURE: 79 MMHG | BODY MASS INDEX: 23.56 KG/M2 | HEIGHT: 65 IN | SYSTOLIC BLOOD PRESSURE: 135 MMHG | WEIGHT: 141.38 LBS

## 2022-02-14 DIAGNOSIS — Z12.4 SCREENING FOR MALIGNANT NEOPLASM OF THE CERVIX: Primary | ICD-10-CM

## 2022-02-14 DIAGNOSIS — Z01.419 ENCOUNTER FOR GYNECOLOGICAL EXAMINATION WITHOUT ABNORMAL FINDING: ICD-10-CM

## 2022-02-14 PROCEDURE — 99999 PR PBB SHADOW E&M-EST. PATIENT-LVL II: ICD-10-PCS | Mod: PBBFAC,,, | Performed by: OBSTETRICS & GYNECOLOGY

## 2022-02-14 PROCEDURE — 99395 PREV VISIT EST AGE 18-39: CPT | Mod: S$PBB,,, | Performed by: OBSTETRICS & GYNECOLOGY

## 2022-02-14 PROCEDURE — 88175 CYTOPATH C/V AUTO FLUID REDO: CPT | Performed by: OBSTETRICS & GYNECOLOGY

## 2022-02-14 PROCEDURE — 99395 PR PREVENTIVE VISIT,EST,18-39: ICD-10-PCS | Mod: S$PBB,,, | Performed by: OBSTETRICS & GYNECOLOGY

## 2022-02-14 PROCEDURE — 87625 HPV TYPES 16 & 18 ONLY: CPT | Performed by: OBSTETRICS & GYNECOLOGY

## 2022-02-14 PROCEDURE — 99999 PR PBB SHADOW E&M-EST. PATIENT-LVL II: CPT | Mod: PBBFAC,,, | Performed by: OBSTETRICS & GYNECOLOGY

## 2022-02-14 PROCEDURE — 87624 HPV HI-RISK TYP POOLED RSLT: CPT | Performed by: OBSTETRICS & GYNECOLOGY

## 2022-02-14 NOTE — PROGRESS NOTES
SUBJECTIVE:   36 y.o. female   for annual routine Pap and checkup. Patient's last menstrual period was 2022..  She has no unusual complaints.        Past Medical History:   Diagnosis Date    Psoriasis      Past Surgical History:   Procedure Laterality Date    DILATION AND CURETTAGE OF UTERUS      missed Ab    WISDOM TOOTH EXTRACTION       Social History     Socioeconomic History    Marital status:    Occupational History    Occupation: teacher/   Tobacco Use    Smoking status: Never Smoker    Smokeless tobacco: Never Used   Substance and Sexual Activity    Alcohol use: No    Drug use: No    Sexual activity: Yes     Partners: Male     Birth control/protection: None, Partner-Vasectomy   Other Topics Concern    Are you pregnant or think you may be? Yes    Breast-feeding No     Family History   Problem Relation Age of Onset    Asthma Son     Psoriasis Father     Breast cancer Neg Hx     Cancer Neg Hx     Ovarian cancer Neg Hx     Melanoma Neg Hx     Colon cancer Neg Hx      OB History    Para Term  AB Living   4 3 3   1 4   SAB IAB Ectopic Multiple Live Births   1     1 3      # Outcome Date GA Lbr Santiago/2nd Weight Sex Delivery Anes PTL Lv   4A Term 17 37w5d  2.835 kg (6 lb 4 oz) F CS-LTranv Spinal     4B Term 17 37w5d  2.353 kg (5 lb 3 oz) M CS-LTranv Spinal  BEVERLY   3 SAB 16 8w0d             Complications: Hemorrhage   2 Term 10/19/14 40w1d 07:31 / 00:17 3.561 kg (7 lb 13.6 oz) M Vag-Spont EPI N BEVERLY      Birth Comments: Normal bili and sats      Complications: Nuchal cord   1 Term 12 39w5d  3.345 kg (7 lb 6 oz) M Vag-Spont EPI  BEVERLY           Current Outpatient Medications   Medication Sig Dispense Refill    apremilast (OTEZLA) 30 mg Tab Take 30 mg by mouth 2 (two) times daily.       No current facility-administered medications for this visit.     Allergies: Patient has no known allergies.     The ASCVD Risk score (Cow[place]villesky BRYAN Jr., et  al., 2013) failed to calculate for the following reasons:    The 2013 ASCVD risk score is only valid for ages 40 to 79      ROS:  Constitutional: no weight loss, weight gain, fever, fatigue  Eyes:  No vision changes, glasses/contacts  ENT/Mouth: No ulcers, sinus problems, ears ringing, headache  Cardiovascular: No inability to lie flat, chest pain, exercise intolerance, swelling, heart palpitations  Respiratory: No wheezing, coughing blood, shortness of breath, or cough  Gastrointestinal: No diarrhea, bloody stool, nausea/vomiting, constipation, gas, hemorrhoids  Genitourinary: No blood in urine, painful urination, urgency of urination, frequency of urination, incomplete emptying, incontinence, abnormal bleeding, painful periods, heavy periods, vaginal discharge, vaginal odor, painful intercourse, sexual problems, bleeding after intercourse.  Musculoskeletal: No muscle weakness  Skin/Breast: No painful breasts, nipple discharge, masses, rash, ulcers  Neurological: No passing out, seizures, numbness, headache  Endocrine: No diabetes, hypothyroid, hyperthyroid, hot flashes, hair loss, abnormal hair growth, acne  Psychiatric: No depression, crying  Hematologic: No bruises, bleeding, swollen lymph nodes, anemia.      Physical Exam:   Constitutional: She is oriented to person, place, and time. She appears well-developed and well-nourished.      Neck: No tracheal deviation present. No thyromegaly present.    Cardiovascular: Exam reveals no edema.     Pulmonary/Chest: Effort normal. She exhibits no mass, no tenderness, no deformity and no retraction. Right breast exhibits no inverted nipple, no mass, no nipple discharge, no skin change, no tenderness, presence, no bleeding and no swelling. Left breast exhibits no inverted nipple, no mass, no nipple discharge, no skin change, no tenderness, presence, no bleeding and no swelling. Breasts are symmetrical.        Abdominal: Soft. She exhibits no distension and no mass. There  is no abdominal tenderness. There is no rebound and no guarding. No hernia. Hernia confirmed negative in the left inguinal area.     Genitourinary:    Vagina and uterus normal.   Rectum:      No external hemorrhoid.   There is no rash, tenderness or lesion on the right labia. There is no rash, tenderness or lesion on the left labia. Cervix is normal. No no adexnal prolapse. Right adnexum displays no mass, no tenderness and no fullness. Left adnexum displays no mass, no tenderness and no fullness. No  no vaginal discharge, tenderness, bleeding, rectocele, cystocele or unspecified prolapse of vaginal walls in the vagina. Cervix exhibits no motion tenderness, no discharge and no friability. Uterus is not deviated.           Musculoskeletal: Normal range of motion and moves all extremeties. No edema.      Lymphadenopathy:        Right: No inguinal adenopathy present.        Left: No inguinal adenopathy present.    Neurological: She is alert and oriented to person, place, and time.    Skin: No rash noted. No erythema. No pallor.    Psychiatric: She has a normal mood and affect. Her behavior is normal. Judgment and thought content normal.         ASSESSMENT:   well woman    PLAN:   pap smear  return annually or prn

## 2022-02-21 LAB
CLINICAL INFO: NORMAL
CYTO CVX: NORMAL
CYTOLOGIST CVX/VAG CYTO: NORMAL
CYTOLOGIST CVX/VAG CYTO: NORMAL
CYTOLOGY CMNT CVX/VAG CYTO-IMP: NORMAL
CYTOLOGY PAP THIN PREP EXPLANATION: NORMAL
DATE OF PREVIOUS PAP: NO
DATE PREVIOUS BX: NO
GEN CATEG CVX/VAG CYTO-IMP: NORMAL
HPV I/H RISK 4 DNA CVX QL NAA+PROBE: DETECTED
HPV16 DNA CVX QL PROBE+SIG AMP: DETECTED
HPV18 DNA CVX QL PROBE+SIG AMP: NOT DETECTED
LMP START DATE: NORMAL
MICROORGANISM CVX/VAG CYTO: NORMAL
PATHOLOGIST CVX/VAG CYTO: NORMAL
SERVICE CMNT-IMP: NORMAL
SPECIMEN SOURCE CVX/VAG CYTO: NORMAL
STAT OF ADQ CVX/VAG CYTO-IMP: NORMAL

## 2022-03-10 ENCOUNTER — PROCEDURE VISIT (OUTPATIENT)
Dept: OBSTETRICS AND GYNECOLOGY | Facility: CLINIC | Age: 37
End: 2022-03-10
Attending: OBSTETRICS & GYNECOLOGY
Payer: COMMERCIAL

## 2022-03-10 VITALS
DIASTOLIC BLOOD PRESSURE: 85 MMHG | BODY MASS INDEX: 23.82 KG/M2 | SYSTOLIC BLOOD PRESSURE: 143 MMHG | HEIGHT: 65 IN | WEIGHT: 143 LBS | HEART RATE: 81 BPM

## 2022-03-10 DIAGNOSIS — R87.810 CERVICAL HIGH RISK HPV (HUMAN PAPILLOMAVIRUS) TEST POSITIVE: Primary | ICD-10-CM

## 2022-03-10 LAB
B-HCG UR QL: NEGATIVE
CTP QC/QA: YES

## 2022-03-10 PROCEDURE — 88305 TISSUE EXAM BY PATHOLOGIST: CPT | Mod: 59 | Performed by: PATHOLOGY

## 2022-03-10 PROCEDURE — 81025 URINE PREGNANCY TEST: CPT | Mod: PBBFAC | Performed by: OBSTETRICS & GYNECOLOGY

## 2022-03-10 PROCEDURE — 88305 TISSUE EXAM BY PATHOLOGIST: CPT | Mod: 26,,, | Performed by: PATHOLOGY

## 2022-03-10 PROCEDURE — 57454 BX/CURETT OF CERVIX W/SCOPE: CPT | Mod: PBBFAC | Performed by: OBSTETRICS & GYNECOLOGY

## 2022-03-10 PROCEDURE — 57454 COLPOSCOPY W/BIOPSY AND ECC- TODAY: ICD-10-PCS | Mod: S$PBB,,, | Performed by: OBSTETRICS & GYNECOLOGY

## 2022-03-10 PROCEDURE — 88305 TISSUE EXAM BY PATHOLOGIST: ICD-10-PCS | Mod: 26,,, | Performed by: PATHOLOGY

## 2022-03-10 NOTE — PROCEDURES
Colposcopy W/BIOPSY AND ECC- Today    Date/Time: 3/10/2022 3:00 PM  Performed by: Lila Carlson MD  Authorized by: Lila Carlson MD     Consent Done?:  Yes (Written)  Timeout:Immediately prior to procedure a time out was called to verify the correct patient, procedure, equipment, support staff and site/side marked as required  Prep:Patient was prepped and draped in the usual sterile fashion  Assistants?: No      Colposcopy Site:  Cervix  Position:  Supine  Acrowhite Lesion? Yes    Atypical Vessels: No    Transformation Zone Adequate?: Yes    Biopsy?: Yes         Location:  Cervix ((11 00 and 12 00))  ECC Performed?: Yes    LEEP Performed?: No    Estimated blood loss (cc):  3   Patient tolerated the procedure well with no immediate complications.     Pa - normal, + HPV 16 and other high risk HPV

## 2022-03-23 LAB
FINAL PATHOLOGIC DIAGNOSIS: NORMAL
Lab: NORMAL

## 2022-06-03 ENCOUNTER — PATIENT MESSAGE (OUTPATIENT)
Dept: OBSTETRICS AND GYNECOLOGY | Facility: CLINIC | Age: 37
End: 2022-06-03
Payer: COMMERCIAL

## 2022-06-03 ENCOUNTER — OFFICE VISIT (OUTPATIENT)
Dept: URGENT CARE | Facility: CLINIC | Age: 37
End: 2022-06-03
Payer: COMMERCIAL

## 2022-06-03 VITALS
HEART RATE: 100 BPM | SYSTOLIC BLOOD PRESSURE: 124 MMHG | TEMPERATURE: 99 F | OXYGEN SATURATION: 96 % | BODY MASS INDEX: 23.82 KG/M2 | RESPIRATION RATE: 16 BRPM | WEIGHT: 143 LBS | HEIGHT: 65 IN | DIASTOLIC BLOOD PRESSURE: 71 MMHG

## 2022-06-03 DIAGNOSIS — Z11.59 ENCOUNTER FOR SCREENING FOR OTHER VIRAL DISEASES: Primary | ICD-10-CM

## 2022-06-03 DIAGNOSIS — U07.1 COVID-19: ICD-10-CM

## 2022-06-03 LAB
CTP QC/QA: YES
SARS-COV-2 RDRP RESP QL NAA+PROBE: POSITIVE

## 2022-06-03 PROCEDURE — 99214 OFFICE O/P EST MOD 30 MIN: CPT | Mod: S$GLB,,, | Performed by: EMERGENCY MEDICINE

## 2022-06-03 PROCEDURE — 99214 PR OFFICE/OUTPT VISIT, EST, LEVL IV, 30-39 MIN: ICD-10-PCS | Mod: S$GLB,,, | Performed by: EMERGENCY MEDICINE

## 2022-06-03 PROCEDURE — U0002 COVID-19 LAB TEST NON-CDC: HCPCS | Mod: QW,S$GLB,, | Performed by: EMERGENCY MEDICINE

## 2022-06-03 PROCEDURE — U0002: ICD-10-PCS | Mod: QW,S$GLB,, | Performed by: EMERGENCY MEDICINE

## 2022-06-03 RX ORDER — PROMETHAZINE HYDROCHLORIDE AND DEXTROMETHORPHAN HYDROBROMIDE 6.25; 15 MG/5ML; MG/5ML
5 SYRUP ORAL EVERY 6 HOURS PRN
Qty: 180 ML | Refills: 0 | Status: SHIPPED | OUTPATIENT
Start: 2022-06-03 | End: 2022-06-13

## 2022-06-03 RX ORDER — NIRMATRELVIR AND RITONAVIR 300-100 MG
KIT ORAL
Qty: 30 TABLET | Refills: 0 | Status: SHIPPED | OUTPATIENT
Start: 2022-06-03 | End: 2023-07-03

## 2022-06-03 NOTE — PROGRESS NOTES
"Subjective:       Patient ID: Jaleesa Menezes is a 36 y.o. female.    Vitals:  height is 5' 5" (1.651 m) and weight is 64.9 kg (143 lb). Her oral temperature is 99.2 °F (37.3 °C). Her blood pressure is 124/71 and her pulse is 100. Her respiration is 16 and oxygen saturation is 96%.     Chief Complaint: COVID-19 Concerns (Entered by patient)    Patient reports home test for covid was positive this morning.Patient reports fever,head pressure and muscle aches.    Fever   This is a new problem. The current episode started today. The problem occurs intermittently. The maximum temperature noted was 100 to 100.9 F. The temperature was taken using a tympanic thermometer. Associated symptoms include muscle aches and a sore throat. Pertinent negatives include no abdominal pain, congestion, coughing, diarrhea, nausea, rash, urinary pain or vomiting. Associated symptoms comments: Sinus pressure. She has tried nothing for the symptoms.   Risk factors: no recent travel and no sick contacts        Constitution: Positive for fever. Negative for chills.   HENT: Positive for sore throat. Negative for congestion.    Respiratory: Negative for cough and sputum production.    Gastrointestinal: Negative for abdominal pain, nausea, vomiting, constipation and diarrhea.   Genitourinary: Negative for dysuria, frequency and urgency.   Musculoskeletal: Negative for muscle cramps and muscle ache.   Skin: Negative for rash and erythema.       Objective:      Physical Exam   Constitutional: She is oriented to person, place, and time. She appears well-developed.   HENT:   Head: Normocephalic and atraumatic. Head is without abrasion, without contusion and without laceration.   Ears:   Right Ear: External ear normal.   Left Ear: External ear normal.   Oropharyngeal exam not performed due to risk of viral transmission during global pandemic-- risks outweigh benefits of exam          Comments: Oropharyngeal exam not performed due to risk of viral " transmission during global pandemic-- risks outweigh benefits of exam      Eyes: Conjunctivae, EOM and lids are normal. Pupils are equal, round, and reactive to light.   Neck: Trachea normal and phonation normal. Neck supple.   Cardiovascular: Normal rate, regular rhythm and normal heart sounds.   Pulmonary/Chest: Effort normal and breath sounds normal. No stridor. No respiratory distress.   Musculoskeletal: Normal range of motion.         General: Normal range of motion.   Neurological: She is alert and oriented to person, place, and time.   Skin: Skin is warm, dry, intact and no rash. Capillary refill takes less than 2 seconds. No abrasion, No burn, No bruising, No erythema and No ecchymosis   Psychiatric: Her speech is normal and behavior is normal. Judgment and thought content normal.   Nursing note and vitals reviewed.         Assessment:       1. Encounter for screening for other viral diseases    2. COVID-19          Plan:         Encounter for screening for other viral diseases  -     POCT COVID-19 Rapid Screening    COVID-19    Other orders  -     nirmatrelvir-ritonavir (PAXLOVID, EUA,) 150 mg x 2- 100 mg copackaged tablets (EUA); Take 3 tablets by mouth 2 (two) times daily. Each dose contains 2 nirmatrelvir (pink tablets) and 1 ritonavir (white tablet). Take all 3 tablets together  Dispense: 30 tablet; Refill: 0  -     promethazine-dextromethorphan (PROMETHAZINE-DM) 6.25-15 mg/5 mL Syrp; Take 5 mLs by mouth every 6 (six) hours as needed (cough or nausea).  Dispense: 180 mL; Refill: 0         Patient given prescription for Paxlovid due to a history of psoriasis        0    Patient Instructions   Go to the Emergency Room if symptoms or condition worsens in any way      Zyrtec, Claritin, or Allegra OTC as directed for the next 7 days    Tylenol 500mg 2 tabs by mouth every 8 hours  Motrin 200mg 2 tabs by mouth every 8 hours  Alternate Tylenol and Motrin every 4hours      Mucinex or Robitussin OTC as directed  for cough    Sudafed as directed for runny nose and congestion    Hot liquids with natural honey for cough, sore throat, and congestion          Instructions for Patients with Confirmed or Suspected COVID-19    If you are awaiting your test result, you will either be called or it will be released to the patient portal.  If you have any questions about your test, please visit [email or web address] or call our COVID-19 information line at [phone].       Stay home and stay away from family members and friends. The CDC says, you can leave home after these three things have happened: 1) You have had no fever for at least 24 hours (that is 1 full day of no fever without the use of medicine that reduces fevers) 2) AND other symptoms have improved (for example, when your cough or shortness of breath have improved) 3) AND at least 5 days have passed since your symptoms first appeared.   Separate yourself from other people and animals in your home.   Call ahead before visiting your doctor.   Wear a facemask.   Cover your coughs and sneezes.   Wash your hands often with soap and water; hand  can be used, too.   Avoid sharing personal household items.   Wipe down surfaces used daily.   Monitor your symptoms. Seek prompt medical attention if your illness is worsening (e.g., difficulty breathing).    Before seeking care, call your healthcare provider.   If you have a medical emergency and need to call 911, notify the dispatch personnel that you have, or are being evaluated for COVID-19. If possible, put on a facemask before emergency medical services arrive.        Recommended precautions for household members, intimate partners, and caregivers in a home setting of a patient with symptomatic laboratory-confirmed COVID-19 or a patient under investigation.  Household members, intimate partners, and caregivers in the home setting awaiting tests results have close contact with a person with  symptomatic, laboratory-confirmed COVID-19 or a person under investigation. Close contacts should monitor their health; they should call their provider right away if they develop symptoms suggestive of COVID-19 (e.g., fever, cough, shortness of breath).    Close contacts should also follow these recommendations:   Make sure that you understand and can help the patient follow their provider's instructions for medication(s) and care. You should help the patient with basic needs in the home and provide support for getting groceries, prescriptions, and other personal needs.   Monitor the patient's symptoms. If the patient is getting sicker, call his or her healthcare provider and tell them that the patient has laboratory-confirmed COVID-19. If the patient has a medical emergency and you need to call 911, notify the dispatch personnel that the patient has, or is being evaluated for COVID-19.   Household members should stay in another room or be  from the patient. Household members should use a separate bedroom and bathroom, if available.   Prohibit visitors.   Household members should care for any pets in the home.   Make sure that shared spaces in the home have good air flow, such as by an air conditioner or an opened window, weather permitting.   Perform hand hygiene frequently. Wash your hands often with soap and water for at least 20 seconds or use an alcohol-based hand  (that contains > 60% alcohol) covering all surfaces of your hands and rubbing them together until they feel dry. Soap and water should be used preferentially.   Avoid touching your eyes, nose, and mouth.   The patient should wear a facemask. If the patient is not able to wear a facemask (for example, because it causes trouble breathing), caregivers should wear a mask when they are in the same room as the patient.   Wear a disposable facemask and gloves when you touch or have contact with the patient's blood, stool, or body  fluids, such as saliva, sputum, nasal mucus, vomit, urine.  o Throw out disposable facemasks and gloves after using them. Do not reuse.  o When removing personal protective equipment, first remove and dispose of gloves. Then, immediately clean your hands with soap and water or alcohol-based hand . Next, remove and dispose of facemask, and immediately clean your hands again with soap and water or alcohol-based hand .   You should not share dishes, drinking glasses, cups, eating utensils, towels, bedding, or other items with the patient. After the patient uses these items, you should wash them thoroughly (see below Wash laundry thoroughly).   Clean all high-touch surfaces, such as counters, tabletops, doorknobs, bathroom fixtures, toilets, phones, keyboards, tablets, and bedside tables, every day. Also, clean any surfaces that may have blood, stool, or body fluids on them.   Use a household cleaning spray or wipe, according to the label instructions. Labels contain instructions for safe and effective use of the cleaning product including precautions you should take when applying the product, such as wearing gloves and making sure you have good ventilation during use of the product.   Wash laundry thoroughly.  o Immediately remove and wash clothes or bedding that have blood, stool, or body fluids on them.  o Wear disposable gloves while handling soiled items and keep soiled items away from your body. Clean your hands (with soap and water or an alcohol-based hand ) immediately after removing your gloves.  o Read and follow directions on labels of laundry or clothing items and detergent. In general, using a normal laundry detergent according to washing machine instructions and dry thoroughly using the warmest temperatures recommended on the clothing label.   Place all used disposable gloves, facemasks, and other contaminated items in a lined container before disposing of them with  other household waste. Clean your hands (with soap and water or an alcohol-based hand ) immediately after handling these items. Soap and water should be used preferentially if hands are visibly dirty.   Discuss any additional questions with your state or local health department or healthcare provider. Check available hours when contacting your local health department.    For more information see CDC link below.      https://www.cdc.gov/coronavirus/2019-ncov/hcp/guidance-prevent-spread.html#precautions        Sources:  CDC, [place] [facility]

## 2022-06-03 NOTE — PATIENT INSTRUCTIONS
Go to the Emergency Room if symptoms or condition worsens in any way      Zyrtec, Claritin, or Allegra OTC as directed for the next 7 days    Tylenol 500mg 2 tabs by mouth every 8 hours  Motrin 200mg 2 tabs by mouth every 8 hours  Alternate Tylenol and Motrin every 4hours      Mucinex or Robitussin OTC as directed for cough    Sudafed as directed for runny nose and congestion    Hot liquids with natural honey for cough, sore throat, and congestion          Instructions for Patients with Confirmed or Suspected COVID-19    If you are awaiting your test result, you will either be called or it will be released to the patient portal.  If you have any questions about your test, please visit [email or web address] or call our COVID-19 information line at [phone].      Stay home and stay away from family members and friends. The CDC says, you can leave home after these three things have happened: 1) You have had no fever for at least 24 hours (that is 1 full day of no fever without the use of medicine that reduces fevers) 2) AND other symptoms have improved (for example, when your cough or shortness of breath have improved) 3) AND at least 5 days have passed since your symptoms first appeared.  Separate yourself from other people and animals in your home.  Call ahead before visiting your doctor.  Wear a facemask.  Cover your coughs and sneezes.  Wash your hands often with soap and water; hand  can be used, too.  Avoid sharing personal household items.  Wipe down surfaces used daily.  Monitor your symptoms. Seek prompt medical attention if your illness is worsening (e.g., difficulty breathing).   Before seeking care, call your healthcare provider.  If you have a medical emergency and need to call 911, notify the dispatch personnel that you have, or are being evaluated for COVID-19. If possible, put on a facemask before emergency medical services arrive.        Recommended precautions for household  members, intimate partners, and caregivers in a home setting of a patient with symptomatic laboratory-confirmed COVID-19 or a patient under investigation.  Household members, intimate partners, and caregivers in the home setting awaiting tests results have close contact with a person with symptomatic, laboratory-confirmed COVID-19 or a person under investigation. Close contacts should monitor their health; they should call their provider right away if they develop symptoms suggestive of COVID-19 (e.g., fever, cough, shortness of breath).    Close contacts should also follow these recommendations:  Make sure that you understand and can help the patient follow their provider's instructions for medication(s) and care. You should help the patient with basic needs in the home and provide support for getting groceries, prescriptions, and other personal needs.  Monitor the patient's symptoms. If the patient is getting sicker, call his or her healthcare provider and tell them that the patient has laboratory-confirmed COVID-19. If the patient has a medical emergency and you need to call 911, notify the dispatch personnel that the patient has, or is being evaluated for COVID-19.  Household members should stay in another room or be  from the patient. Household members should use a separate bedroom and bathroom, if available.  Prohibit visitors.  Household members should care for any pets in the home.  Make sure that shared spaces in the home have good air flow, such as by an air conditioner or an opened window, weather permitting.  Perform hand hygiene frequently. Wash your hands often with soap and water for at least 20 seconds or use an alcohol-based hand  (that contains > 60% alcohol) covering all surfaces of your hands and rubbing them together until they feel dry. Soap and water should be used preferentially.  Avoid touching your eyes, nose, and mouth.  The patient should wear a facemask. If the patient  is not able to wear a facemask (for example, because it causes trouble breathing), caregivers should wear a mask when they are in the same room as the patient.  Wear a disposable facemask and gloves when you touch or have contact with the patient's blood, stool, or body fluids, such as saliva, sputum, nasal mucus, vomit, urine.  Throw out disposable facemasks and gloves after using them. Do not reuse.  When removing personal protective equipment, first remove and dispose of gloves. Then, immediately clean your hands with soap and water or alcohol-based hand . Next, remove and dispose of facemask, and immediately clean your hands again with soap and water or alcohol-based hand .  You should not share dishes, drinking glasses, cups, eating utensils, towels, bedding, or other items with the patient. After the patient uses these items, you should wash them thoroughly (see below Wash laundry thoroughly).  Clean all high-touch surfaces, such as counters, tabletops, doorknobs, bathroom fixtures, toilets, phones, keyboards, tablets, and bedside tables, every day. Also, clean any surfaces that may have blood, stool, or body fluids on them.  Use a household cleaning spray or wipe, according to the label instructions. Labels contain instructions for safe and effective use of the cleaning product including precautions you should take when applying the product, such as wearing gloves and making sure you have good ventilation during use of the product.  Wash laundry thoroughly.  Immediately remove and wash clothes or bedding that have blood, stool, or body fluids on them.  Wear disposable gloves while handling soiled items and keep soiled items away from your body. Clean your hands (with soap and water or an alcohol-based hand ) immediately after removing your gloves.  Read and follow directions on labels of laundry or clothing items and detergent. In general, using a normal laundry detergent  according to washing machine instructions and dry thoroughly using the warmest temperatures recommended on the clothing label.  Place all used disposable gloves, facemasks, and other contaminated items in a lined container before disposing of them with other household waste. Clean your hands (with soap and water or an alcohol-based hand ) immediately after handling these items. Soap and water should be used preferentially if hands are visibly dirty.  Discuss any additional questions with your state or local health department or healthcare provider. Check available hours when contacting your local health department.    For more information see CDC link below.      https://www.cdc.gov/coronavirus/2019-ncov/hcp/guidance-prevent-spread.html#precautions        Sources:  [facility], [facility] and [facility]

## 2022-06-28 ENCOUNTER — OFFICE VISIT (OUTPATIENT)
Dept: URGENT CARE | Facility: CLINIC | Age: 37
End: 2022-06-28
Payer: COMMERCIAL

## 2022-06-28 VITALS
HEART RATE: 99 BPM | BODY MASS INDEX: 23.82 KG/M2 | RESPIRATION RATE: 18 BRPM | OXYGEN SATURATION: 99 % | WEIGHT: 143 LBS | HEIGHT: 65 IN | TEMPERATURE: 98 F | SYSTOLIC BLOOD PRESSURE: 145 MMHG | DIASTOLIC BLOOD PRESSURE: 75 MMHG

## 2022-06-28 DIAGNOSIS — R20.0 NUMBNESS OF FACE: Primary | ICD-10-CM

## 2022-06-28 PROCEDURE — 99214 OFFICE O/P EST MOD 30 MIN: CPT | Mod: S$GLB,,, | Performed by: NURSE PRACTITIONER

## 2022-06-28 PROCEDURE — 99214 PR OFFICE/OUTPT VISIT, EST, LEVL IV, 30-39 MIN: ICD-10-PCS | Mod: S$GLB,,, | Performed by: NURSE PRACTITIONER

## 2022-06-28 NOTE — PROGRESS NOTES
"Subjective:       Patient ID: Jaleesa Menezes is a 36 y.o. female.    Vitals:  height is 5' 5" (1.651 m) and weight is 64.9 kg (143 lb). Her temperature is 98 °F (36.7 °C). Her blood pressure is 145/75 (abnormal) and her pulse is 99. Her respiration is 18 and oxygen saturation is 99%.     Chief Complaint: Numbness on right side of face, arm and leg waxing and wanig    Pt states for the past two days numbness only on the right side only ear and face.   Pt describes facial pain as tightness.   Pt states stopped her otezela due to plaoxvid medication, now has tapered down to one a day.  Dx. With covid-19 on 6/3-- about 25 days ago-- took paxlovid.     Facial Pain  This is a new problem. The current episode started 1 to 4 weeks ago. The problem occurs constantly. The problem has been unchanged. Associated symptoms include fatigue and numbness (right side lower face). Pertinent negatives include no abdominal pain, anorexia, arthralgias, change in bowel habit, chest pain, chills, congestion, coughing, diaphoresis, fever, headaches, joint swelling, myalgias, nausea, neck pain, rash, sore throat, swollen glands, urinary symptoms, vertigo, visual change, vomiting or weakness.       Constitution: Positive for fatigue. Negative for chills, sweating and fever.   HENT: Positive for facial swelling and postnasal drip. Negative for congestion and sore throat.    Neck: Negative for neck pain.   Cardiovascular: Negative for chest pain.   Respiratory: Negative for cough and shortness of breath.    Gastrointestinal: Negative for abdominal pain, nausea and vomiting.   Musculoskeletal: Negative for joint pain, joint swelling and muscle ache.   Skin: Negative for rash.   Neurological: Positive for numbness (right side lower face). Negative for dizziness, history of vertigo, facial drooping, speech difficulty, headaches and loss of consciousness.       Objective:      Physical Exam   Constitutional: She is oriented to person, place, " and time.  Non-toxic appearance. She does not appear ill. No distress.   HENT:   Head:       Nose: No mucosal edema or rhinorrhea.   Mouth/Throat: Uvula is midline, oropharynx is clear and moist and mucous membranes are normal. No oral lesions. No trismus in the jaw. No uvula swelling. No tonsillar exudate.   Pulmonary/Chest: Effort normal. No respiratory distress.   Musculoskeletal:      Right shoulder: Normal.      Left shoulder: Normal.   Neurological: She is alert and oriented to person, place, and time. She has normal sensation. She displays facial symmetry. Gait normal.      Comments: Sharp/soft tactile discrimination on both sides of face   Skin: Skin is not diaphoretic.   Nursing note and vitals reviewed.        Assessment:       1. Numbness of face          Plan:         Numbness of face  -     Ambulatory referral/consult to Neurology           Medical Decision Making:   Initial Assessment:   Right lower face numbness and  warmth x 3 weeks--more significant in last 2 days-- dx covid about 3 weeks ago-- took paxlovid  Differential Diagnosis:   Long covid symptom vs. paxlovid SE     Less likely Bell's palsy, trigeminal neuralgia, stroke-- CN III, IV, V, VI, VII, XI, XII intact  Discussed antivirals vs. Steroids but since symptoms have been present for so long and unsure of etiology at this point-- would prefer evaluation by neurology, who can determine what if any treatment is indicated.

## 2022-06-28 NOTE — PATIENT INSTRUCTIONS
Return to Urgent Care or go to ER if symptoms worsen or fail to improve.  Follow up with PCP as recommended for further management.     You have been referred to Neurology for further evaluation and management.  Scheduling should contact you to make an appointment.  If you do not hear from anyone or if you would like to make an appointment ASAP, please call [phone] to schedule an appointment.

## 2023-01-03 ENCOUNTER — PATIENT MESSAGE (OUTPATIENT)
Dept: RESEARCH | Facility: HOSPITAL | Age: 38
End: 2023-01-03
Payer: COMMERCIAL

## 2023-01-29 NOTE — TELEPHONE ENCOUNTER
Endometrial biopsy    Date/Time: 12/7/2022 10:30 AM  Performed by: Marivel Decker MD  Authorized by: Marivel Decker MD     Consent:     Consent given by:  Patient    Patient questions answered: yes      Patient agrees, verbalizes understanding, and wants to proceed: yes      Educational handouts given: yes      Instructions and paperwork completed: yes    Indication:     Indications: Other disorder of menstruation and other abnormal bleeding from female genital tract    Pre-procedure:     Pre-procedure timeout performed: yes    Procedure:     Procedure: endometrial biopsy with Pipelle      Cervix cleaned and prepped: yes      A paracervical block was performed: yes      An intracervical block was performed: yes      The cervix was dilated: yes      Uterus sounded: yes      Uterus sound depth (cm):  5.5    Curettes used:  1    Patient tolerated procedure well with no complications: yes     LM on VM with appt infor for vulva clinic

## 2023-05-12 ENCOUNTER — PATIENT MESSAGE (OUTPATIENT)
Dept: RESEARCH | Facility: HOSPITAL | Age: 38
End: 2023-05-12
Payer: COMMERCIAL

## 2023-07-03 ENCOUNTER — OFFICE VISIT (OUTPATIENT)
Dept: OBSTETRICS AND GYNECOLOGY | Facility: CLINIC | Age: 38
End: 2023-07-03
Attending: OBSTETRICS & GYNECOLOGY
Payer: COMMERCIAL

## 2023-07-03 VITALS
HEIGHT: 65 IN | WEIGHT: 145 LBS | BODY MASS INDEX: 24.16 KG/M2 | HEART RATE: 116 BPM | DIASTOLIC BLOOD PRESSURE: 88 MMHG | SYSTOLIC BLOOD PRESSURE: 142 MMHG

## 2023-07-03 DIAGNOSIS — Z12.31 OTHER SCREENING MAMMOGRAM: ICD-10-CM

## 2023-07-03 DIAGNOSIS — Z01.419 ENCOUNTER FOR GYNECOLOGICAL EXAMINATION WITHOUT ABNORMAL FINDING: ICD-10-CM

## 2023-07-03 DIAGNOSIS — Z12.4 SCREENING FOR MALIGNANT NEOPLASM OF THE CERVIX: Primary | ICD-10-CM

## 2023-07-03 PROCEDURE — 87624 HPV HI-RISK TYP POOLED RSLT: CPT | Performed by: OBSTETRICS & GYNECOLOGY

## 2023-07-03 PROCEDURE — 99395 PR PREVENTIVE VISIT,EST,18-39: ICD-10-PCS | Mod: S$GLB,,, | Performed by: OBSTETRICS & GYNECOLOGY

## 2023-07-03 PROCEDURE — 99395 PREV VISIT EST AGE 18-39: CPT | Mod: S$GLB,,, | Performed by: OBSTETRICS & GYNECOLOGY

## 2023-07-03 PROCEDURE — 99999 PR PBB SHADOW E&M-EST. PATIENT-LVL III: ICD-10-PCS | Mod: PBBFAC,,, | Performed by: OBSTETRICS & GYNECOLOGY

## 2023-07-03 PROCEDURE — 88175 CYTOPATH C/V AUTO FLUID REDO: CPT | Performed by: PATHOLOGY

## 2023-07-03 PROCEDURE — 88141 CYTOPATH C/V INTERPRET: CPT | Mod: ,,, | Performed by: PATHOLOGY

## 2023-07-03 PROCEDURE — 88141 PR  CYTOPATH CERV/VAG INTERPRET: ICD-10-PCS | Mod: ,,, | Performed by: PATHOLOGY

## 2023-07-03 PROCEDURE — 99999 PR PBB SHADOW E&M-EST. PATIENT-LVL III: CPT | Mod: PBBFAC,,, | Performed by: OBSTETRICS & GYNECOLOGY

## 2023-07-03 NOTE — PROGRESS NOTES
SUBJECTIVE:   37 y.o. female   for annual routine Pap and checkup. Patient's last menstrual period was 2023..  She reports cycles are normal. She is due for her first mammogram. .        Past Medical History:   Diagnosis Date    HPV (human papilloma virus) infection     Psoriasis      Past Surgical History:   Procedure Laterality Date    COLPOSCOPY      DILATION AND CURETTAGE OF UTERUS      missed Ab    WISDOM TOOTH EXTRACTION       Social History     Socioeconomic History    Marital status:    Occupational History    Occupation: teacher/   Tobacco Use    Smoking status: Never    Smokeless tobacco: Never   Substance and Sexual Activity    Alcohol use: No    Drug use: No    Sexual activity: Yes     Partners: Male     Birth control/protection: None, Partner-Vasectomy   Other Topics Concern    Are you pregnant or think you may be? Yes    Breast-feeding No     Family History   Problem Relation Age of Onset    Psoriasis Father     Asthma Son     Breast cancer Neg Hx     Cancer Neg Hx     Ovarian cancer Neg Hx     Melanoma Neg Hx     Colon cancer Neg Hx     Cervical cancer Neg Hx     Uterine cancer Neg Hx      OB History    Para Term  AB Living   4 3 3   1 4   SAB IAB Ectopic Multiple Live Births   1     1 3      # Outcome Date GA Lbr Santiago/2nd Weight Sex Delivery Anes PTL Lv   4A Term 17 37w5d  2.835 kg (6 lb 4 oz) F CS-LTranv Spinal     4B Term 17 37w5d  2.353 kg (5 lb 3 oz) M CS-LTranv Spinal  BEVERLY   3 SAB 16 8w0d             Complications: Hemorrhage   2 Term 10/19/14 40w1d 07:31 / 00:17 3.561 kg (7 lb 13.6 oz) M Vag-Spont EPI N BEVERLY      Birth Comments: Normal bili and sats      Complications: Nuchal cord   1 Term 12 39w5d  3.345 kg (7 lb 6 oz) M Vag-Spont EPI  BEVERLY           Current Outpatient Medications   Medication Sig Dispense Refill    apremilast (OTEZLA) 30 mg Tab Take 30 mg by mouth 2 (two) times daily.       No current facility-administered  medications for this visit.     Allergies: Patient has no known allergies.     The ASCVD Risk score (Tyrone DK, et al., 2019) failed to calculate for the following reasons:    The 2019 ASCVD risk score is only valid for ages 40 to 79      ROS:  Constitutional: no weight loss, weight gain, fever, fatigue  Eyes:  No vision changes, glasses/contacts  ENT/Mouth: No ulcers, sinus problems, ears ringing, headache  Cardiovascular: No inability to lie flat, chest pain, exercise intolerance, swelling, heart palpitations  Respiratory: No wheezing, coughing blood, shortness of breath, or cough  Gastrointestinal: No diarrhea, bloody stool, nausea/vomiting, constipation, gas, hemorrhoids  Genitourinary: No blood in urine, painful urination, urgency of urination, frequency of urination, incomplete emptying, incontinence, abnormal bleeding, painful periods, heavy periods, vaginal discharge, vaginal odor, painful intercourse, sexual problems, bleeding after intercourse.  Musculoskeletal: No muscle weakness  Skin/Breast: No painful breasts, nipple discharge, masses, rash, ulcers  Neurological: No passing out, seizures, numbness, headache  Endocrine: No diabetes, hypothyroid, hyperthyroid, hot flashes, hair loss, abnormal hair growth, acne  Psychiatric: No depression, crying  Hematologic: No bruises, bleeding, swollen lymph nodes, anemia.      Physical Exam:   Constitutional: She is oriented to person, place, and time. She appears well-developed and well-nourished.      Neck: No tracheal deviation present. No thyromegaly present.    Cardiovascular:       Exam reveals no edema.        Pulmonary/Chest: Effort normal. She exhibits no mass, no tenderness, no deformity and no retraction. Right breast exhibits no inverted nipple, no mass, no nipple discharge, no skin change, no tenderness, presence, no bleeding and no swelling. Left breast exhibits no inverted nipple, no mass, no nipple discharge, no skin change, no tenderness, presence,  no bleeding and no swelling. Breasts are symmetrical.        Abdominal: Soft. She exhibits no distension and no mass. There is no abdominal tenderness. There is no rebound and no guarding. No hernia. Hernia confirmed negative in the left inguinal area.     Genitourinary:    Vagina and uterus normal.   Rectum:      No external hemorrhoid.   There is no rash, tenderness or lesion on the right labia. There is no rash, tenderness or lesion on the left labia. Cervix is normal. No no adexnal prolapse. Right adnexum displays no mass, no tenderness and no fullness. Left adnexum displays no mass, no tenderness and no fullness. No  no vaginal discharge, tenderness, bleeding, rectocele, cystocele or unspecified prolapse of vaginal walls in the vagina. Cervix exhibits no motion tenderness, no discharge and no friability. Uterus is not deviated.           Musculoskeletal: Normal range of motion and moves all extremeties. No edema.       Neurological: She is alert and oriented to person, place, and time.    Skin: No rash noted. No erythema. No pallor.    Psychiatric: She has a normal mood and affect. Her behavior is normal. Judgment and thought content normal.       ASSESSMENT:   well woman    PLAN:   mammogram  pap smear  return annually or prn

## 2023-07-10 LAB
FINAL PATHOLOGIC DIAGNOSIS: ABNORMAL
HPV HR 12 DNA SPEC QL NAA+PROBE: NEGATIVE
HPV16 AG SPEC QL: NEGATIVE
HPV18 DNA SPEC QL NAA+PROBE: NEGATIVE
Lab: ABNORMAL

## 2023-07-17 ENCOUNTER — PROCEDURE VISIT (OUTPATIENT)
Dept: OBSTETRICS AND GYNECOLOGY | Facility: CLINIC | Age: 38
End: 2023-07-17
Attending: OBSTETRICS & GYNECOLOGY
Payer: COMMERCIAL

## 2023-07-17 ENCOUNTER — TELEPHONE (OUTPATIENT)
Dept: OBSTETRICS AND GYNECOLOGY | Facility: CLINIC | Age: 38
End: 2023-07-17

## 2023-07-17 VITALS
WEIGHT: 145 LBS | BODY MASS INDEX: 24.16 KG/M2 | DIASTOLIC BLOOD PRESSURE: 96 MMHG | HEART RATE: 89 BPM | HEIGHT: 65 IN | SYSTOLIC BLOOD PRESSURE: 151 MMHG

## 2023-07-17 DIAGNOSIS — R87.611 PAP SMEAR OF CERVIX WITH ASCUS, CANNOT EXCLUDE HGSIL: Primary | ICD-10-CM

## 2023-07-17 PROCEDURE — 57454 BX/CURETT OF CERVIX W/SCOPE: CPT | Mod: S$GLB,,, | Performed by: OBSTETRICS & GYNECOLOGY

## 2023-07-17 PROCEDURE — 88342 IMHCHEM/IMCYTCHM 1ST ANTB: CPT | Mod: 26,,, | Performed by: PATHOLOGY

## 2023-07-17 PROCEDURE — 88305 TISSUE EXAM BY PATHOLOGIST: CPT | Mod: 26,,, | Performed by: PATHOLOGY

## 2023-07-17 PROCEDURE — 88342 IMHCHEM/IMCYTCHM 1ST ANTB: CPT | Performed by: PATHOLOGY

## 2023-07-17 PROCEDURE — 88305 TISSUE EXAM BY PATHOLOGIST: CPT | Performed by: PATHOLOGY

## 2023-07-17 PROCEDURE — 88305 TISSUE EXAM BY PATHOLOGIST: ICD-10-PCS | Mod: 26,,, | Performed by: PATHOLOGY

## 2023-07-17 PROCEDURE — 57454 COLPOSCOPY W/BIOPSY AND ECC- TODAY: ICD-10-PCS | Mod: S$GLB,,, | Performed by: OBSTETRICS & GYNECOLOGY

## 2023-07-17 PROCEDURE — 88342 CHG IMMUNOCYTOCHEMISTRY: ICD-10-PCS | Mod: 26,,, | Performed by: PATHOLOGY

## 2023-07-17 NOTE — PROCEDURES
Colposcopy W/BIOPSY AND ECC- Today    Date/Time: 7/17/2023 3:00 PM  Performed by: Lila Carlson MD  Authorized by: Lila Carlson MD     Consent Done?:  Yes (Written)  Timeout:Immediately prior to procedure a time out was called to verify the correct patient, procedure, equipment, support staff and site/side marked as required  Prep:Patient was prepped and draped in the usual sterile fashion  Assistants?: No      Colposcopy Site:  Cervix  Position:  Supine  Acrowhite Lesion? Yes    Atypical Vessels: No    Transformation Zone Adequate?: Yes    Biopsy?: Yes         Location:  Cervix ((11 00, 12 00 and 1 00))  ECC Performed?: Yes    LEEP Performed?: No    Estimated blood loss (cc):  5   Patient tolerated the procedure well with no immediate complications.     ASCUS pap  cannot exclude HSIL  HPV negative

## 2023-07-21 LAB
FINAL PATHOLOGIC DIAGNOSIS: NORMAL
GROSS: NORMAL
Lab: NORMAL
MICROSCOPIC EXAM: NORMAL

## 2023-08-16 ENCOUNTER — HOSPITAL ENCOUNTER (OUTPATIENT)
Dept: RADIOLOGY | Facility: OTHER | Age: 38
Discharge: HOME | End: 2023-08-16
Attending: OBSTETRICS & GYNECOLOGY
Payer: COMMERCIAL

## 2023-08-16 DIAGNOSIS — Z12.31 OTHER SCREENING MAMMOGRAM: ICD-10-CM

## 2023-08-16 PROCEDURE — 77067 MAMMO DIGITAL SCREENING BILAT WITH TOMO: ICD-10-PCS | Mod: 26,,, | Performed by: RADIOLOGY

## 2023-08-16 PROCEDURE — 77063 MAMMO DIGITAL SCREENING BILAT WITH TOMO: ICD-10-PCS | Mod: 26,,, | Performed by: RADIOLOGY

## 2023-08-16 PROCEDURE — 77067 SCR MAMMO BI INCL CAD: CPT | Mod: 26,,, | Performed by: RADIOLOGY

## 2023-08-16 PROCEDURE — 77067 SCR MAMMO BI INCL CAD: CPT | Mod: TC

## 2023-08-16 PROCEDURE — 77063 BREAST TOMOSYNTHESIS BI: CPT | Mod: 26,,, | Performed by: RADIOLOGY

## 2025-02-10 ENCOUNTER — OFFICE VISIT (OUTPATIENT)
Dept: OBSTETRICS AND GYNECOLOGY | Facility: CLINIC | Age: 40
End: 2025-02-10
Attending: OBSTETRICS & GYNECOLOGY
Payer: COMMERCIAL

## 2025-02-10 VITALS
HEIGHT: 65 IN | WEIGHT: 137 LBS | HEART RATE: 108 BPM | DIASTOLIC BLOOD PRESSURE: 83 MMHG | BODY MASS INDEX: 22.82 KG/M2 | SYSTOLIC BLOOD PRESSURE: 183 MMHG

## 2025-02-10 DIAGNOSIS — Z12.31 VISIT FOR SCREENING MAMMOGRAM: Primary | ICD-10-CM

## 2025-02-10 DIAGNOSIS — Z12.4 PAP SMEAR FOR CERVICAL CANCER SCREENING: ICD-10-CM

## 2025-02-10 DIAGNOSIS — Z01.419 ENCOUNTER FOR GYNECOLOGICAL EXAMINATION WITHOUT ABNORMAL FINDING: ICD-10-CM

## 2025-02-10 PROCEDURE — 87624 HPV HI-RISK TYP POOLED RSLT: CPT | Performed by: OBSTETRICS & GYNECOLOGY

## 2025-02-10 PROCEDURE — 99999 PR PBB SHADOW E&M-EST. PATIENT-LVL III: CPT | Mod: PBBFAC,,, | Performed by: OBSTETRICS & GYNECOLOGY

## 2025-02-10 PROCEDURE — 3079F DIAST BP 80-89 MM HG: CPT | Mod: CPTII,S$GLB,, | Performed by: OBSTETRICS & GYNECOLOGY

## 2025-02-10 PROCEDURE — 3077F SYST BP >= 140 MM HG: CPT | Mod: CPTII,S$GLB,, | Performed by: OBSTETRICS & GYNECOLOGY

## 2025-02-10 PROCEDURE — 99396 PREV VISIT EST AGE 40-64: CPT | Mod: S$GLB,,, | Performed by: OBSTETRICS & GYNECOLOGY

## 2025-02-10 PROCEDURE — 88175 CYTOPATH C/V AUTO FLUID REDO: CPT | Performed by: PATHOLOGY

## 2025-02-10 PROCEDURE — 3008F BODY MASS INDEX DOCD: CPT | Mod: CPTII,S$GLB,, | Performed by: OBSTETRICS & GYNECOLOGY

## 2025-02-10 PROCEDURE — 1159F MED LIST DOCD IN RCRD: CPT | Mod: CPTII,S$GLB,, | Performed by: OBSTETRICS & GYNECOLOGY

## 2025-02-10 NOTE — PROGRESS NOTES
SUBJECTIVE:   39 y.o. female   for annual routine Pap and checkup. Patient's last menstrual period was 2025..  She reports she is getting a divorce. .    She will be getting hernia repaired - abdomen    Past Medical History:   Diagnosis Date    Abnormal Pap smear of cervix     HPV (human papilloma virus) infection     Psoriasis      Past Surgical History:   Procedure Laterality Date    COLPOSCOPY      DILATION AND CURETTAGE OF UTERUS      missed Ab    WISDOM TOOTH EXTRACTION       Social History     Socioeconomic History    Marital status:    Occupational History    Occupation: teacher/   Tobacco Use    Smoking status: Never    Smokeless tobacco: Never   Substance and Sexual Activity    Alcohol use: No    Drug use: No    Sexual activity: Yes     Partners: Male     Birth control/protection: None, Partner-Vasectomy   Other Topics Concern    Are you pregnant or think you may be? Yes    Breast-feeding No     Family History   Problem Relation Name Age of Onset    Psoriasis Father      Asthma Son      Breast cancer Neg Hx      Cancer Neg Hx      Ovarian cancer Neg Hx      Melanoma Neg Hx      Colon cancer Neg Hx      Cervical cancer Neg Hx      Uterine cancer Neg Hx       OB History    Para Term  AB Living   4 3 3   1 4   SAB IAB Ectopic Multiple Live Births   1     1 3      # Outcome Date GA Lbr Santiago/2nd Weight Sex Type Anes PTL Lv   4A Term 17 37w5d  2.835 kg (6 lb 4 oz) F CS-LTranv Spinal     4B Term 17 37w5d  2.353 kg (5 lb 3 oz) M CS-LTranv Spinal  BEVERLY   3 SAB 16 8w0d             Complications: Hemorrhage   2 Term 10/19/14 40w1d 07:31 / 00:17 3.561 kg (7 lb 13.6 oz) M Vag-Spont EPI N BEVERLY      Birth Comments: Normal bili and sats      Complications: Nuchal cord   1 Term 12 39w5d  3.345 kg (7 lb 6 oz) M Vag-Spont EPI  BEVERLY           Current Outpatient Medications   Medication Sig Dispense Refill    apremilast (OTEZLA) 30 mg Tab Take 30 mg by mouth 2  (two) times daily.       No current facility-administered medications for this visit.     Allergies: Patient has no known allergies.     The ASCVD Risk score ([place] DK, et al., 2019) failed to calculate for the following reasons:    Cannot find a previous HDL lab    Cannot find a previous total cholesterol lab      ROS:  Constitutional: no weight loss, weight gain, fever, fatigue  Eyes:  No vision changes, glasses/contacts  ENT/Mouth: No ulcers, sinus problems, ears ringing, headache  Cardiovascular: No inability to lie flat, chest pain, exercise intolerance, swelling, heart palpitations  Respiratory: No wheezing, coughing blood, shortness of breath, or cough  Gastrointestinal: No diarrhea, bloody stool, nausea/vomiting, constipation, gas, hemorrhoids  Genitourinary: No blood in urine, painful urination, urgency of urination, frequency of urination, incomplete emptying, incontinence, abnormal bleeding, painful periods, heavy periods, vaginal discharge, vaginal odor, painful intercourse, sexual problems, bleeding after intercourse.  Musculoskeletal: No muscle weakness  Skin/Breast: No painful breasts, nipple discharge, masses, rash, ulcers  Neurological: No passing out, seizures, numbness, headache  Endocrine: No diabetes, hypothyroid, hyperthyroid, hot flashes, hair loss, abnormal hair growth, acne  Psychiatric: No depression, crying  Hematologic: No bruises, bleeding, swollen lymph nodes, anemia.      Physical Exam:   Constitutional: She is oriented to person, place, and time. She appears well-developed and well-nourished.      Neck: No tracheal deviation present. No thyromegaly present.    Cardiovascular:       Exam reveals no edema.        Pulmonary/Chest: Effort normal. She exhibits no mass, no tenderness, no deformity and no retraction. Right breast exhibits no inverted nipple, no mass, no nipple discharge, no skin change, no tenderness, presence, no bleeding and no swelling. Left breast exhibits no inverted  nipple, no mass, no nipple discharge, no skin change, no tenderness, presence, no bleeding and no swelling. Breasts are symmetrical.        Abdominal: Soft. She exhibits no distension and no mass. There is no abdominal tenderness. There is no rebound and no guarding. No hernia. Hernia confirmed negative in the left inguinal area.     Genitourinary:    Vagina and uterus normal.   Rectum:      No external hemorrhoid.   There is no rash, tenderness or lesion on the right labia. There is no rash, tenderness or lesion on the left labia. Cervix is normal. No no adexnal prolapse. Right adnexum displays no mass, no tenderness and no fullness. Left adnexum displays no mass, no tenderness and no fullness. No vaginal discharge, tenderness, bleeding, rectocele, cystocele or prolapse of vaginal walls in the vagina. Cervix exhibits no motion tenderness, no discharge and no friability. Uterus is not deviated.           Musculoskeletal: Normal range of motion and moves all extremeties. No edema.       Neurological: She is alert and oriented to person, place, and time.    Skin: No rash noted. No erythema. No pallor.    Psychiatric: She has a normal mood and affect. Her behavior is normal. Judgment and thought content normal.         ASSESSMENT:   well woman  History of mild dysplasia  PLAN:   mammogram  pap smear/HPV  return annually or prn

## 2025-02-14 LAB
FINAL PATHOLOGIC DIAGNOSIS: ABNORMAL
Lab: ABNORMAL

## 2025-02-16 ENCOUNTER — RESULTS FOLLOW-UP (OUTPATIENT)
Dept: OBSTETRICS AND GYNECOLOGY | Facility: CLINIC | Age: 40
End: 2025-02-16